# Patient Record
Sex: MALE | Race: WHITE | Employment: OTHER | URBAN - METROPOLITAN AREA
[De-identification: names, ages, dates, MRNs, and addresses within clinical notes are randomized per-mention and may not be internally consistent; named-entity substitution may affect disease eponyms.]

---

## 2018-03-12 ENCOUNTER — HOSPITAL ENCOUNTER (OUTPATIENT)
Dept: CT IMAGING | Age: 83
Discharge: HOME | End: 2018-03-12
Attending: ORTHOPAEDIC SURGERY
Payer: MEDICARE

## 2018-03-12 DIAGNOSIS — M16.11 PRIMARY OSTEOARTHRITIS OF RIGHT HIP: ICD-10-CM

## 2018-03-12 PROCEDURE — 73700 CT LOWER EXTREMITY W/O DYE: CPT

## 2018-04-10 RX ORDER — GABAPENTIN 800 MG/1
800 TABLET ORAL 3 TIMES DAILY
COMMUNITY

## 2018-04-10 RX ORDER — ZOLPIDEM TARTRATE 10 MG/1
10 TABLET ORAL
COMMUNITY

## 2018-04-10 RX ORDER — CHOLECALCIFEROL (VITAMIN D3) 125 MCG
2 CAPSULE ORAL DAILY
COMMUNITY

## 2018-04-10 RX ORDER — ASCORBIC ACID 500 MG
1000 TABLET ORAL DAILY
COMMUNITY

## 2018-04-10 RX ORDER — METFORMIN HYDROCHLORIDE 500 MG/1
500 TABLET ORAL
COMMUNITY

## 2018-04-10 RX ORDER — BACLOFEN 10 MG/1
10 TABLET ORAL 3 TIMES DAILY
COMMUNITY

## 2018-04-10 RX ORDER — HYDROCODONE BITARTRATE AND ACETAMINOPHEN 7.5; 325 MG/1; MG/1
1 TABLET ORAL 4 TIMES DAILY
COMMUNITY
End: 2018-04-26

## 2018-04-10 RX ORDER — ALBUTEROL SULFATE 90 UG/1
2 AEROSOL, METERED RESPIRATORY (INHALATION)
COMMUNITY

## 2018-04-11 ENCOUNTER — HOSPITAL ENCOUNTER (OUTPATIENT)
Dept: PREADMISSION TESTING | Age: 83
Discharge: HOME | End: 2018-04-11
Payer: MEDICARE

## 2018-04-11 VITALS
RESPIRATION RATE: 24 BRPM | HEIGHT: 72 IN | SYSTOLIC BLOOD PRESSURE: 146 MMHG | WEIGHT: 194.67 LBS | OXYGEN SATURATION: 97 % | DIASTOLIC BLOOD PRESSURE: 68 MMHG | TEMPERATURE: 98.5 F | BODY MASS INDEX: 26.37 KG/M2 | HEART RATE: 99 BPM

## 2018-04-11 LAB
ABO + RH BLD: NORMAL
ALBUMIN SERPL-MCNC: 3.7 G/DL (ref 3.5–5)
ALBUMIN/GLOB SERPL: 1.2 {RATIO} (ref 1.1–2.2)
ALP SERPL-CCNC: 48 U/L (ref 45–117)
ALT SERPL-CCNC: 18 U/L (ref 12–78)
ANION GAP SERPL CALC-SCNC: 5 MMOL/L (ref 5–15)
APPEARANCE UR: CLEAR
AST SERPL-CCNC: 14 U/L (ref 15–37)
ATRIAL RATE: 83 BPM
BACTERIA URNS QL MICRO: NEGATIVE /HPF
BASOPHILS # BLD: 0 K/UL (ref 0–0.1)
BASOPHILS NFR BLD: 0 % (ref 0–1)
BILIRUB SERPL-MCNC: 0.5 MG/DL (ref 0.2–1)
BILIRUB UR QL CFM: NEGATIVE
BLOOD GROUP ANTIBODIES SERPL: NORMAL
BUN SERPL-MCNC: 23 MG/DL (ref 6–20)
BUN/CREAT SERPL: 22 (ref 12–20)
CALCIUM SERPL-MCNC: 9.4 MG/DL (ref 8.5–10.1)
CALCULATED P AXIS, ECG09: 77 DEGREES
CALCULATED R AXIS, ECG10: 64 DEGREES
CALCULATED T AXIS, ECG11: 63 DEGREES
CHLORIDE SERPL-SCNC: 104 MMOL/L (ref 97–108)
CO2 SERPL-SCNC: 31 MMOL/L (ref 21–32)
COLOR UR: NORMAL
CREAT SERPL-MCNC: 1.06 MG/DL (ref 0.7–1.3)
CRP SERPL-MCNC: 2.29 MG/DL
DIAGNOSIS, 93000: NORMAL
DIFFERENTIAL METHOD BLD: ABNORMAL
EOSINOPHIL # BLD: 0.1 K/UL (ref 0–0.4)
EOSINOPHIL NFR BLD: 1 % (ref 0–7)
EPITH CASTS URNS QL MICRO: NORMAL /LPF
ERYTHROCYTE [DISTWIDTH] IN BLOOD BY AUTOMATED COUNT: 13.3 % (ref 11.5–14.5)
ERYTHROCYTE [SEDIMENTATION RATE] IN BLOOD: 22 MM/HR (ref 0–20)
EST. AVERAGE GLUCOSE BLD GHB EST-MCNC: 160 MG/DL
GLOBULIN SER CALC-MCNC: 3.1 G/DL (ref 2–4)
GLUCOSE SERPL-MCNC: 138 MG/DL (ref 65–100)
GLUCOSE UR STRIP.AUTO-MCNC: NEGATIVE MG/DL
HBA1C MFR BLD: 7.2 % (ref 4.2–6.3)
HCT VFR BLD AUTO: 42.9 % (ref 36.6–50.3)
HGB BLD-MCNC: 14.1 G/DL (ref 12.1–17)
HGB UR QL STRIP: NEGATIVE
IMM GRANULOCYTES # BLD: 0.1 K/UL (ref 0–0.04)
IMM GRANULOCYTES NFR BLD AUTO: 1 % (ref 0–0.5)
KETONES UR QL STRIP.AUTO: NEGATIVE MG/DL
LEUKOCYTE ESTERASE UR QL STRIP.AUTO: NEGATIVE
LYMPHOCYTES # BLD: 2.2 K/UL (ref 0.8–3.5)
LYMPHOCYTES NFR BLD: 23 % (ref 12–49)
MCH RBC QN AUTO: 30.6 PG (ref 26–34)
MCHC RBC AUTO-ENTMCNC: 32.9 G/DL (ref 30–36.5)
MCV RBC AUTO: 93.1 FL (ref 80–99)
MONOCYTES # BLD: 0.9 K/UL (ref 0–1)
MONOCYTES NFR BLD: 10 % (ref 5–13)
NEUTS SEG # BLD: 6.4 K/UL (ref 1.8–8)
NEUTS SEG NFR BLD: 66 % (ref 32–75)
NITRITE UR QL STRIP.AUTO: NEGATIVE
NRBC # BLD: 0 K/UL (ref 0–0.01)
NRBC BLD-RTO: 0 PER 100 WBC
P-R INTERVAL, ECG05: 128 MS
PH UR STRIP: 5.5 [PH] (ref 5–8)
PLATELET # BLD AUTO: 262 K/UL (ref 150–400)
PMV BLD AUTO: 10.2 FL (ref 8.9–12.9)
POTASSIUM SERPL-SCNC: 4.7 MMOL/L (ref 3.5–5.1)
PROT SERPL-MCNC: 6.8 G/DL (ref 6.4–8.2)
PROT UR STRIP-MCNC: NEGATIVE MG/DL
Q-T INTERVAL, ECG07: 358 MS
QRS DURATION, ECG06: 84 MS
QTC CALCULATION (BEZET), ECG08: 420 MS
RBC # BLD AUTO: 4.61 M/UL (ref 4.1–5.7)
RBC #/AREA URNS HPF: NORMAL /HPF (ref 0–5)
SODIUM SERPL-SCNC: 140 MMOL/L (ref 136–145)
SP GR UR REFRACTOMETRY: 1.03 (ref 1–1.03)
SPECIMEN EXP DATE BLD: NORMAL
UA: UC IF INDICATED,UAUC: NORMAL
UROBILINOGEN UR QL STRIP.AUTO: 0.2 EU/DL (ref 0.2–1)
VENTRICULAR RATE, ECG03: 83 BPM
WBC # BLD AUTO: 9.7 K/UL (ref 4.1–11.1)
WBC URNS QL MICRO: NORMAL /HPF (ref 0–4)

## 2018-04-11 PROCEDURE — 83036 HEMOGLOBIN GLYCOSYLATED A1C: CPT | Performed by: ORTHOPAEDIC SURGERY

## 2018-04-11 PROCEDURE — 81001 URINALYSIS AUTO W/SCOPE: CPT | Performed by: ORTHOPAEDIC SURGERY

## 2018-04-11 PROCEDURE — 85652 RBC SED RATE AUTOMATED: CPT | Performed by: ORTHOPAEDIC SURGERY

## 2018-04-11 PROCEDURE — 80053 COMPREHEN METABOLIC PANEL: CPT | Performed by: ORTHOPAEDIC SURGERY

## 2018-04-11 PROCEDURE — 84466 ASSAY OF TRANSFERRIN: CPT | Performed by: ORTHOPAEDIC SURGERY

## 2018-04-11 PROCEDURE — 93005 ELECTROCARDIOGRAM TRACING: CPT

## 2018-04-11 PROCEDURE — 86901 BLOOD TYPING SEROLOGIC RH(D): CPT | Performed by: ORTHOPAEDIC SURGERY

## 2018-04-11 PROCEDURE — 86140 C-REACTIVE PROTEIN: CPT | Performed by: ORTHOPAEDIC SURGERY

## 2018-04-11 PROCEDURE — 36415 COLL VENOUS BLD VENIPUNCTURE: CPT | Performed by: ORTHOPAEDIC SURGERY

## 2018-04-11 PROCEDURE — 85025 COMPLETE CBC W/AUTO DIFF WBC: CPT | Performed by: ORTHOPAEDIC SURGERY

## 2018-04-11 RX ORDER — LANOLIN ALCOHOL/MO/W.PET/CERES
500 CREAM (GRAM) TOPICAL DAILY
COMMUNITY

## 2018-04-11 NOTE — PERIOP NOTES
[facility] PREOPERATIVE INSTRUCTIONS    Surgery Date:  4/25/18    Surgery arrival time given by surgeon: NO  (If [facility] staff will call you between 3pm - 7pm the day before surgery with your arrival time. If your surgery is on a Monday, we will call you the preceding Friday. Please call [phone] after 7pm if you did not receive your arrival time.)  1. Report to the 2nd Floor Admitting Desk on the day of your surgery. Bring your insurance card, photo identification, and any copayment (if applicable). 2. You must have a responsible adult to drive you home and stay with you the first 24 hours after surgery if you are going home the same day of your surgery. 3. Nothing to eat or drink after midnight the night before surgery. This means NO water, gum, mints, coffee, juice, etc.    4. MEDICATIONS TO TAKE THE MORNING OF SURGERY WITH A SIP OF WATER:  Gabapentin, baclofen  5. No alcoholic beverages 24 hours before and after your surgery. 6. If you are being admitted to the hospital, please leave personal belongings/luggage in your car until you have an assigned hospital room number. (The hospital discharge time is NOON. Your adult  should be at the hospital prior to the noon discharge time unless otherwise instructed.)   7. STOP Aspirin and/or any non-steroidal anti-inflammatory drugs (i.e. Ibuprofen, Naproxen, Advil, Aleve) as directed by your surgeon. You may take Tylenol. Stop herbal supplements 1 week prior to surgery. 8. Wear comfortable clothes. Wear your glasses instead of contacts. Please leave all money, jewelry and valuables at home. No make-up, particularly mascara, the day of surgery. 9. REMOVE ALL body piercings, rings, and jewelry and leave at home. Wear your hair loose or down, no pony-tails, buns, or any metal hair clips. 10. If you shower the morning of surgery, please do not apply any lotions, powders, or deodorants afterwards.    Do not shave any body area within 24 hours of your surgery. 11. Please follow all instructions to avoid any potential surgical cancellation. 12. Should your physical condition change, (i.e. fever, cold, flu, etc.) please notify your surgeon as soon as possible. 13. If a situation occurs where you may be delayed, please call:  [phone]. 14. The Preadmission Testing staff can be reached at [phone]. 16.  Special Instructions:  · Use Chlorhexidine Care Fusion wash and sponges 3 days prior to surgery as instructed. · Incentive spirometer given with instructions to practice at home and bring back to the hospital on the day of surgery. · [facility] will contact you if your Hemoglobin A1C is greater than 7.5. · Ensure/Glucerna  sample, nutritional information, and Ensure/Glucerna coupon given. · Pain pamphlet and Call Don't Fall reminder reviewed with patient. ·  parking is complimentary Monday - Friday 7 am - 5 pm  · Bring PTA Medication list day of surgery with the last doses taken documented   · Do not bring medication bottles the day of surgery  The patient was contacted in person. He verbalizes understanding of all instructions does not need reinforcement. Patient knows to bring walker & albuterol.

## 2018-04-11 NOTE — H&P
PAT Pre-Op History & Physical    Patient: Tricia Billings                  MRN: [id number]          SSN: [id number]  YOB: 1935          Age: 80 y.o. Sex: male                Subjective:   Patient is a 80 y.o.  male who presents with history of chronic right hip pain that has been a problem for > 10 years per patient report. Rates his right hip pain 10/10 and describes it as a constant sharp pain. States that pain radiates from his right groin and outer hip into his right buttock. Walking exacerbates his hip pain as does getting in/out of car. States that his right hip pain makes it difficult to sleep at night. Has failed steroid joint injections, NSAIDs, narcotic pain medication, and PT. The patient was evaluated in the surgeon's office and it was determined that the most appropriate plan of care is to proceed with surgical intervention.   Patient's PCP Odilia Fountain MD            Past Medical History:   Diagnosis Date    Adverse effect of anesthesia     woke up during surgery     Arthritis     Cancer ([address] 2009    squamous cell lung- had RUL lobectomy    Chronic obstructive pulmonary disease ([address]     emphysema    Chronic pain     scar tissue in chest, neuropathy    Diabetes ([address]     Diverticulitis     Frequent falls     Gout     Insomnia     Lymphoma ([address] 2008    Had chemotherapy    Macular degeneration of right eye     Neuropathy     Paget's disease of bone     Skin cancer     Thromboembolus ([address] 1988    right arm/ subclavian after injury to right arm      Past Surgical History:   Procedure Laterality Date    CHEST SURGERY PROCEDURE UNLISTED  05/2009    Right upper lobe resection for cancer    HX CATARACT REMOVAL Bilateral     HX CHOLECYSTECTOMY      HX GI  1967    exploratory surgery, removed gall bladder    HX HERNIA REPAIR Left 2012    hernia repair x2    [facility]  9612,[id number], [id number]    lower back surgery x4    TOTAL HIP ARTHROPLASTY Left 2012      Prior to Admission medications    Medication Sig Start Date End Date Taking? Authorizing Provider   cyanocobalamin (VITAMIN B-12) 500 mcg tablet Take 500 mcg by mouth daily. Yes Historical Provider   HYDROcodone-acetaminophen (NORCO) 7.5-325 mg per tablet Take 1 Tab by mouth four (4) times daily. Yes Historical Provider   gabapentin (NEURONTIN) 800 mg tablet Take 800 mg by mouth three (3) times daily. Yes Historical Provider   zolpidem (AMBIEN) 10 mg tablet Take 10 mg by mouth nightly as needed. Yes Historical Provider   baclofen (LIORESAL) 10 mg tablet Take 10 mg by mouth three (3) times daily. Yes Historical Provider   DICLOFENAC SODIUM PO Take 75 mg by mouth two (2) times a day. Yes Historical Provider   metFORMIN (GLUCOPHAGE) 500 mg tablet Take 500 mg by mouth daily (with breakfast). Yes Historical Provider   albuterol (PROAIR HFA) 90 mcg/actuation inhaler Take 2 Puffs by inhalation every four (4) hours as needed for Wheezing. Yes Historical Provider   VIT A/VIT C/VIT E/ZINC/COPPER (PRESERVISION AREDS PO) Take 1 Cap by mouth daily. Yes Historical Provider   ascorbic acid, vitamin C, (VITAMIN C) 500 mg tablet Take 1,000 mg by mouth daily. Yes Historical Provider   cholecalciferol, vitamin D3, (VITAMIN D3) 2,000 unit tab Take 2 Tabs by mouth daily. Yes Historical Provider     Current Outpatient Prescriptions   Medication Sig    cyanocobalamin (VITAMIN B-12) 500 mcg tablet Take 500 mcg by mouth daily.  HYDROcodone-acetaminophen (NORCO) 7.5-325 mg per tablet Take 1 Tab by mouth four (4) times daily.  gabapentin (NEURONTIN) 800 mg tablet Take 800 mg by mouth three (3) times daily.  zolpidem (AMBIEN) 10 mg tablet Take 10 mg by mouth nightly as needed.  baclofen (LIORESAL) 10 mg tablet Take 10 mg by mouth three (3) times daily.  DICLOFENAC SODIUM PO Take 75 mg by mouth two (2) times a day.     metFORMIN (GLUCOPHAGE) 500 mg tablet Take 500 mg by mouth daily (with breakfast).  albuterol (PROAIR HFA) 90 mcg/actuation inhaler Take 2 Puffs by inhalation every four (4) hours as needed for Wheezing.  VIT A/VIT C/VIT E/ZINC/COPPER (PRESERVISION AREDS PO) Take 1 Cap by mouth daily.  ascorbic acid, vitamin C, (VITAMIN C) 500 mg tablet Take 1,000 mg by mouth daily.  cholecalciferol, vitamin D3, (VITAMIN D3) 2,000 unit tab Take 2 Tabs by mouth daily. No current facility-administered medications for this encounter. Allergies   Allergen Reactions    Demerol [Meperidine] Other (comments)     Agitation and confusion    Pcn [Penicillins] Swelling      Social History   Substance Use Topics    Smoking status: Former Smoker     Packs/day: 0.75     Years: 50.00     Quit date: 4/10/1993    Smokeless tobacco: Never Used    Alcohol use No      History   Drug Use No     Family History   Problem Relation Age of Onset    Heart Disease Mother     Cancer Father      Melanoma    Parkinson's Disease Sister     Cancer Sister      skin    Other Brother      Agent Orange Exposure    Parkinson's Disease Sister     Dementia Sister          Review of Systems    Patient denies difficulty swallowing, mouth sores, or loose teeth. Patient denies any recent dental procedures or any planned prior to surgery. Patient denies chest pain, tightness, pain radiating down left arm, palpitations. Denies dizziness, visual disturbances, or lightheadedness. Patient denies shortness of breath at rest, wheezing, cough, fever, or chills. Patient denies diarrhea, constipation, or abdominal pain. Patient denies urinary problems including dysuria, hesitancy, urgency, or incontinence. Does c/o nocturia. Denies skin breakdown, rashes, insect bites or open area. C/o right hip and right knee pain.         Objective:   Patient Vitals for the past 24 hrs:   Temp Pulse Resp BP SpO2   18 1316 98.5 °F (36.9 °C) 99 24 146/68 97 %     Temp (24hrs), Av.5 °F (36.9 °C), Min:98.5 °F (36.9 °C), Max:98.5 °F (36.9 °C)    Body mass index is 26.4 kg/(m^2). Wt Readings from Last 1 Encounters:   04/11/18 88.3 kg (194 lb 10.7 oz)        Physical Exam:     General: Pleasant,  cooperative, no apparent distress, appears stated age. Uses cane to ambulate. Eyes: Conjunctivae/corneas clear. EOMs intact. Ears: Bilateral hearing aids in place. Nose: Nares normal.   Mouth/Throat: Lips, mucosa, and tongue normal. Edentulous. Neck: Supple, symmetrical, trachea midline. Back: Symmetric   Lungs: Diminished breath sounds but clear to auscultation bilaterally. Heart: Regular rate and rhythm, S1, S2 normal. No murmur, click, rub or gallop. Abdomen: Soft, non-tender. Bowel sounds normal. No distention. Musculoskeletal:  Gait is antalgic. Extremities:  Extremities normal, atraumatic, no cyanosis or edema. Calves                                 supple, non tender to palpation. Pulses: 2+ and symmetric bilateral upper extremities. Cap. refill <2 seconds   Skin: Skin color, texture, turgor normal for age. No rashes or lesions. Neurologic: CN II-XII grossly intact. Alert and oriented x3.     Labs:   Recent Results (from the past 72 hour(s))   CULTURE, MRSA    Collection Time: 04/11/18  1:13 PM   Result Value Ref Range    Special Requests: NO SPECIAL REQUESTS      Culture result: MRSA NOT PRESENT  AT 15 HOURS     TYPE & SCREEN    Collection Time: 04/11/18  2:20 PM   Result Value Ref Range    Crossmatch Expiration 04/25/2018     ABO/Rh(D) A POSITIVE     Antibody screen NEG    C REACTIVE PROTEIN, QT    Collection Time: 04/11/18  2:20 PM   Result Value Ref Range    C-Reactive protein 2.29 (H) <0.60 mg/dL   CBC WITH AUTOMATED DIFF    Collection Time: 04/11/18  2:20 PM   Result Value Ref Range    WBC 9.7 4.1 - 11.1 K/uL    RBC 4.61 4.10 - 5.70 M/uL    HGB 14.1 12.1 - 17.0 g/dL    HCT 42.9 36.6 - 50.3 %    MCV 93.1 80.0 - 99.0 FL    MCH 30.6 26.0 - 34.0 PG    MCHC 32.9 30.0 - 36.5 g/dL    RDW 13.3 11.5 - 14.5 %    PLATELET 888 435 - 658 K/uL    MPV 10.2 8.9 - 12.9 FL    NRBC 0.0 0  WBC    ABSOLUTE NRBC 0.00 0.00 - 0.01 K/uL    NEUTROPHILS 66 32 - 75 %    LYMPHOCYTES 23 12 - 49 %    MONOCYTES 10 5 - 13 %    EOSINOPHILS 1 0 - 7 %    BASOPHILS 0 0 - 1 %    IMMATURE GRANULOCYTES 1 (H) 0.0 - 0.5 %    ABS. NEUTROPHILS 6.4 1.8 - 8.0 K/UL    ABS. LYMPHOCYTES 2.2 0.8 - 3.5 K/UL    ABS. MONOCYTES 0.9 0.0 - 1.0 K/UL    ABS. EOSINOPHILS 0.1 0.0 - 0.4 K/UL    ABS. BASOPHILS 0.0 0.0 - 0.1 K/UL    ABS. IMM. GRANS. 0.1 (H) 0.00 - 0.04 K/UL    DF AUTOMATED     METABOLIC PANEL, COMPREHENSIVE    Collection Time: 04/11/18  2:20 PM   Result Value Ref Range    Sodium 140 136 - 145 mmol/L    Potassium 4.7 3.5 - 5.1 mmol/L    Chloride 104 97 - 108 mmol/L    CO2 31 21 - 32 mmol/L    Anion gap 5 5 - 15 mmol/L    Glucose 138 (H) 65 - 100 mg/dL    BUN 23 (H) 6 - 20 MG/DL    Creatinine 1.06 0.70 - 1.30 MG/DL    BUN/Creatinine ratio 22 (H) 12 - 20      GFR est AA >60 >60 ml/min/1.73m2    GFR est non-AA >60 >60 ml/min/1.73m2    Calcium 9.4 8.5 - 10.1 MG/DL    Bilirubin, total 0.5 0.2 - 1.0 MG/DL    ALT (SGPT) 18 12 - 78 U/L    AST (SGOT) 14 (L) 15 - 37 U/L    Alk.  phosphatase 48 45 - 117 U/L    Protein, total 6.8 6.4 - 8.2 g/dL    Albumin 3.7 3.5 - 5.0 g/dL    Globulin 3.1 2.0 - 4.0 g/dL    A-G Ratio 1.2 1.1 - 2.2     HEMOGLOBIN A1C WITH EAG    Collection Time: 04/11/18  2:20 PM   Result Value Ref Range    Hemoglobin A1c 7.2 (H) 4.2 - 6.3 %    Est. average glucose 160 mg/dL   SED RATE (ESR)    Collection Time: 04/11/18  2:20 PM   Result Value Ref Range    Sed rate, automated 22 (H) 0 - 20 mm/hr   URINALYSIS W/ REFLEX CULTURE    Collection Time: 04/11/18  2:20 PM   Result Value Ref Range    Color DARK YELLOW      Appearance CLEAR CLEAR      Specific gravity 1.026 1.003 - 1.030      pH (UA) 5.5 5.0 - 8.0      Protein NEGATIVE  NEG mg/dL    Glucose NEGATIVE  NEG mg/dL    Ketone NEGATIVE  NEG mg/dL    Blood NEGATIVE  NEG Urobilinogen 0.2 0.2 - 1.0 EU/dL    Nitrites NEGATIVE  NEG      Leukocyte Esterase NEGATIVE  NEG      WBC 0-4 0 - 4 /hpf    RBC 0-5 0 - 5 /hpf    Epithelial cells FEW FEW /lpf    Bacteria NEGATIVE  NEG /hpf    UA:UC IF INDICATED CULTURE NOT INDICATED BY UA RESULT CNI     BILIRUBIN, CONFIRM    Collection Time: 04/11/18  2:20 PM   Result Value Ref Range    Bilirubin UA, confirm NEGATIVE  NEG     EKG, 12 LEAD, INITIAL    Collection Time: 04/11/18  2:43 PM   Result Value Ref Range    Ventricular Rate 83 BPM    Atrial Rate 83 BPM    P-R Interval 128 ms    QRS Duration 84 ms    Q-T Interval 358 ms    QTC Calculation (Bezet) 420 ms    Calculated P Axis 77 degrees    Calculated R Axis 64 degrees    Calculated T Axis 63 degrees    Diagnosis       Normal sinus rhythm  Normal ECG  No previous ECGs available  Confirmed by Duane Latham MD., Gray Lugo ([id number]) on 4/11/2018 10:19:43 PM         Assessment:     Degenerative joint disease right hip. Chronic pain- sees pain management 1x/month- takes Hydrocodone 7.5/325 four times a day. Plan:     Scheduled for right hip arthroplasty direct anterior. Labs and EKG done per surgeon's order. Lab results and EKG reviewed- unremarkable except CRP elevated (2.29) and ESR slightly elevated 22. MRSA and transferrin pending. Attended joint class 04/11/2018. Last PCP note dated 03/06/2018 from Dr. Serene Hurtado reviewed and placed on chart. Pain management after surgery could be problematic.       Quynh Lema NP

## 2018-04-12 LAB
BACTERIA SPEC CULT: NORMAL
BACTERIA SPEC CULT: NORMAL
SERVICE CMNT-IMP: NORMAL

## 2018-04-12 NOTE — PROGRESS NOTES
Problem: Patient Education: Go to Patient Education Activity  Goal: Patient/Family Education  Outcome: Resolved/Met Date Met: 04/12/18  The patient attended the pre-operative joint replacement class. The content of the class, using a power-point presentation as well as visual demonstration was specific for patients undergoing total knee and total hip replacements. A pre-operative education booklet was provided to all patients. At the conclusion of class an opportunity was offered for any question or concerns the patient or family may have regarding their upcoming scheduled procedure. Patient and  attended class on 4/11/18.

## 2018-04-13 LAB — TRANSFERRIN SERPL-MCNC: 229 MG/DL (ref 200–370)

## 2018-04-24 ENCOUNTER — ANESTHESIA EVENT (OUTPATIENT)
Dept: SURGERY | Age: 83
DRG: 470 | End: 2018-04-24
Payer: MEDICARE

## 2018-04-25 ENCOUNTER — ANESTHESIA (OUTPATIENT)
Dept: SURGERY | Age: 83
DRG: 470 | End: 2018-04-25
Payer: MEDICARE

## 2018-04-25 ENCOUNTER — HOSPITAL ENCOUNTER (INPATIENT)
Age: 83
LOS: 1 days | Discharge: HOME | DRG: 470 | End: 2018-04-26
Attending: ORTHOPAEDIC SURGERY | Admitting: ORTHOPAEDIC SURGERY
Payer: MEDICARE

## 2018-04-25 ENCOUNTER — APPOINTMENT (OUTPATIENT)
Dept: GENERAL RADIOLOGY | Age: 83
DRG: 470 | End: 2018-04-25
Attending: PHYSICIAN ASSISTANT
Payer: MEDICARE

## 2018-04-25 DIAGNOSIS — M16.11 PRIMARY OSTEOARTHRITIS OF RIGHT HIP: Primary | ICD-10-CM

## 2018-04-25 LAB
GLUCOSE BLD STRIP.AUTO-MCNC: 104 MG/DL (ref 65–100)
GLUCOSE BLD STRIP.AUTO-MCNC: 158 MG/DL (ref 65–100)
GLUCOSE BLD STRIP.AUTO-MCNC: 168 MG/DL (ref 65–100)
SERVICE CMNT-IMP: ABNORMAL

## 2018-04-25 PROCEDURE — 77030039266 HC ADH SKN EXOFIN S2SG -A: Performed by: ORTHOPAEDIC SURGERY

## 2018-04-25 PROCEDURE — 77030020263 HC SOL INJ SOD CL0.9% LFCR 1000ML: Performed by: ORTHOPAEDIC SURGERY

## 2018-04-25 PROCEDURE — 74011250636 HC RX REV CODE- 250/636: Performed by: ANESTHESIOLOGY

## 2018-04-25 PROCEDURE — 82962 GLUCOSE BLOOD TEST: CPT

## 2018-04-25 PROCEDURE — 77030029821: Performed by: ORTHOPAEDIC SURGERY

## 2018-04-25 PROCEDURE — 0SR902Z REPLACEMENT OF RIGHT HIP JOINT WITH METAL ON POLYETHYLENE SYNTHETIC SUBSTITUTE, OPEN APPROACH: ICD-10-PCS | Performed by: ORTHOPAEDIC SURGERY

## 2018-04-25 PROCEDURE — 74011250636 HC RX REV CODE- 250/636: Performed by: PHYSICIAN ASSISTANT

## 2018-04-25 PROCEDURE — 94762 N-INVAS EAR/PLS OXIMTRY CONT: CPT

## 2018-04-25 PROCEDURE — 77030018883 HC BLD SAW SAG4 STRY -B: Performed by: ORTHOPAEDIC SURGERY

## 2018-04-25 PROCEDURE — C1713 ANCHOR/SCREW BN/BN,TIS/BN: HCPCS | Performed by: ORTHOPAEDIC SURGERY

## 2018-04-25 PROCEDURE — 77030031139 HC SUT VCRL2 J&J -A: Performed by: ORTHOPAEDIC SURGERY

## 2018-04-25 PROCEDURE — 74011000250 HC RX REV CODE- 250: Performed by: ORTHOPAEDIC SURGERY

## 2018-04-25 PROCEDURE — 77030020782 HC GWN BAIR PAWS FLX 3M -B

## 2018-04-25 PROCEDURE — 74011250636 HC RX REV CODE- 250/636

## 2018-04-25 PROCEDURE — 77030002933 HC SUT MCRYL J&J -A: Performed by: ORTHOPAEDIC SURGERY

## 2018-04-25 PROCEDURE — 74011250637 HC RX REV CODE- 250/637: Performed by: PHYSICIAN ASSISTANT

## 2018-04-25 PROCEDURE — 74011250637 HC RX REV CODE- 250/637: Performed by: ANESTHESIOLOGY

## 2018-04-25 PROCEDURE — 77030020788: Performed by: ORTHOPAEDIC SURGERY

## 2018-04-25 PROCEDURE — 77030035236 HC SUT PDS STRATFX BARB J&J -B: Performed by: ORTHOPAEDIC SURGERY

## 2018-04-25 PROCEDURE — 76030000020 HC AMB SURG 1.5 TO 2 HR INTENSV-TIER 1: Performed by: ORTHOPAEDIC SURGERY

## 2018-04-25 PROCEDURE — 74011000272 HC RX REV CODE- 272: Performed by: ORTHOPAEDIC SURGERY

## 2018-04-25 PROCEDURE — 76210000035 HC AMBSU PH I REC 1 TO 1.5 HR: Performed by: ORTHOPAEDIC SURGERY

## 2018-04-25 PROCEDURE — 77030029829 HC TIB INST CKPNT DISP STRY -B: Performed by: ORTHOPAEDIC SURGERY

## 2018-04-25 PROCEDURE — 65270000029 HC RM PRIVATE

## 2018-04-25 PROCEDURE — 74011000250 HC RX REV CODE- 250

## 2018-04-25 PROCEDURE — 72170 X-RAY EXAM OF PELVIS: CPT

## 2018-04-25 PROCEDURE — C1776 JOINT DEVICE (IMPLANTABLE): HCPCS | Performed by: ORTHOPAEDIC SURGERY

## 2018-04-25 PROCEDURE — 76060000063 HC AMB SURG ANES 1.5 TO 2 HR: Performed by: ORTHOPAEDIC SURGERY

## 2018-04-25 PROCEDURE — 77030011640 HC PAD GRND REM COVD -A: Performed by: ORTHOPAEDIC SURGERY

## 2018-04-25 PROCEDURE — 77030012935 HC DRSG AQUACEL BMS -B: Performed by: ORTHOPAEDIC SURGERY

## 2018-04-25 PROCEDURE — 77030003666 HC NDL SPINAL BD -A

## 2018-04-25 PROCEDURE — 77030013708 HC HNDPC SUC IRR PULS STRY –B: Performed by: ORTHOPAEDIC SURGERY

## 2018-04-25 PROCEDURE — 77030018673: Performed by: ORTHOPAEDIC SURGERY

## 2018-04-25 PROCEDURE — 77030018836 HC SOL IRR NACL ICUM -A: Performed by: ORTHOPAEDIC SURGERY

## 2018-04-25 PROCEDURE — 77030020262 HC SOL INJ SOD CL 0.9% 100ML: Performed by: ORTHOPAEDIC SURGERY

## 2018-04-25 PROCEDURE — 77030007866 HC KT SPN ANES BBMI -B

## 2018-04-25 DEVICE — CERAMIC V40 FEMORAL HEAD
Type: IMPLANTABLE DEVICE | Site: HIP | Status: FUNCTIONAL
Brand: BIOLOX

## 2018-04-25 DEVICE — HEMISPHERICAL CLUSTER HOLE SHELL
Type: IMPLANTABLE DEVICE | Site: HIP | Status: FUNCTIONAL
Brand: TRITANIUM

## 2018-04-25 DEVICE — 127 DEGREE NECK ANGLE HIP STEM
Type: IMPLANTABLE DEVICE | Site: HIP | Status: FUNCTIONAL
Brand: ACCOLADE

## 2018-04-25 DEVICE — 0 DEGREE POLYETHYLENE INSERT
Type: IMPLANTABLE DEVICE | Site: HIP | Status: FUNCTIONAL
Brand: TRIDENT

## 2018-04-25 DEVICE — COMPONENT HIP PRSS FT MTL ON CERM POLYETH X3: Type: IMPLANTABLE DEVICE | Status: FUNCTIONAL

## 2018-04-25 RX ORDER — SODIUM CHLORIDE, SODIUM LACTATE, POTASSIUM CHLORIDE, CALCIUM CHLORIDE 600; 310; 30; 20 MG/100ML; MG/100ML; MG/100ML; MG/100ML
125 INJECTION, SOLUTION INTRAVENOUS CONTINUOUS
Status: DISCONTINUED | OUTPATIENT
Start: 2018-04-25 | End: 2018-04-25 | Stop reason: HOSPADM

## 2018-04-25 RX ORDER — DIPHENHYDRAMINE HYDROCHLORIDE 50 MG/ML
12.5 INJECTION, SOLUTION INTRAMUSCULAR; INTRAVENOUS
Status: DISCONTINUED | OUTPATIENT
Start: 2018-04-25 | End: 2018-04-26 | Stop reason: HOSPADM

## 2018-04-25 RX ORDER — HYDROMORPHONE HYDROCHLORIDE 2 MG/ML
.25-1 INJECTION, SOLUTION INTRAMUSCULAR; INTRAVENOUS; SUBCUTANEOUS
Status: DISCONTINUED | OUTPATIENT
Start: 2018-04-25 | End: 2018-04-25 | Stop reason: HOSPADM

## 2018-04-25 RX ORDER — FENTANYL CITRATE 50 UG/ML
25 INJECTION, SOLUTION INTRAMUSCULAR; INTRAVENOUS
Status: DISCONTINUED | OUTPATIENT
Start: 2018-04-25 | End: 2018-04-25 | Stop reason: HOSPADM

## 2018-04-25 RX ORDER — ACETAMINOPHEN 325 MG/1
650 TABLET ORAL EVERY 6 HOURS
Status: DISCONTINUED | OUTPATIENT
Start: 2018-04-25 | End: 2018-04-26 | Stop reason: HOSPADM

## 2018-04-25 RX ORDER — DEXAMETHASONE SODIUM PHOSPHATE 4 MG/ML
INJECTION, SOLUTION INTRA-ARTICULAR; INTRALESIONAL; INTRAMUSCULAR; INTRAVENOUS; SOFT TISSUE AS NEEDED
Status: DISCONTINUED | OUTPATIENT
Start: 2018-04-25 | End: 2018-04-25 | Stop reason: HOSPADM

## 2018-04-25 RX ORDER — MAGNESIUM SULFATE 100 %
4 CRYSTALS MISCELLANEOUS AS NEEDED
Status: DISCONTINUED | OUTPATIENT
Start: 2018-04-25 | End: 2018-04-26 | Stop reason: HOSPADM

## 2018-04-25 RX ORDER — EPHEDRINE SULFATE 50 MG/ML
INJECTION, SOLUTION INTRAVENOUS AS NEEDED
Status: DISCONTINUED | OUTPATIENT
Start: 2018-04-25 | End: 2018-04-25 | Stop reason: HOSPADM

## 2018-04-25 RX ORDER — LIDOCAINE HYDROCHLORIDE 10 MG/ML
0.1 INJECTION, SOLUTION EPIDURAL; INFILTRATION; INTRACAUDAL; PERINEURAL AS NEEDED
Status: DISCONTINUED | OUTPATIENT
Start: 2018-04-25 | End: 2018-04-25 | Stop reason: HOSPADM

## 2018-04-25 RX ORDER — CELECOXIB 100 MG/1
200 CAPSULE ORAL 2 TIMES DAILY
Status: DISCONTINUED | OUTPATIENT
Start: 2018-04-25 | End: 2018-04-26 | Stop reason: HOSPADM

## 2018-04-25 RX ORDER — PROPOFOL 10 MG/ML
INJECTION, EMULSION INTRAVENOUS
Status: DISCONTINUED | OUTPATIENT
Start: 2018-04-25 | End: 2018-04-25 | Stop reason: HOSPADM

## 2018-04-25 RX ORDER — SODIUM CHLORIDE 0.9 % (FLUSH) 0.9 %
5-10 SYRINGE (ML) INJECTION EVERY 8 HOURS
Status: DISCONTINUED | OUTPATIENT
Start: 2018-04-26 | End: 2018-04-26 | Stop reason: HOSPADM

## 2018-04-25 RX ORDER — METFORMIN HYDROCHLORIDE 500 MG/1
500 TABLET ORAL
Status: DISCONTINUED | OUTPATIENT
Start: 2018-04-26 | End: 2018-04-26 | Stop reason: HOSPADM

## 2018-04-25 RX ORDER — POLYETHYLENE GLYCOL 3350 17 G/17G
17 POWDER, FOR SOLUTION ORAL DAILY
Status: DISCONTINUED | OUTPATIENT
Start: 2018-04-26 | End: 2018-04-26 | Stop reason: HOSPADM

## 2018-04-25 RX ORDER — ONDANSETRON 8 MG/1
4 TABLET, ORALLY DISINTEGRATING ORAL
Qty: 30 TAB | Refills: 0 | Status: SHIPPED | OUTPATIENT
Start: 2018-04-25

## 2018-04-25 RX ORDER — MIDAZOLAM HYDROCHLORIDE 1 MG/ML
2 INJECTION, SOLUTION INTRAMUSCULAR; INTRAVENOUS
Status: DISCONTINUED | OUTPATIENT
Start: 2018-04-25 | End: 2018-04-25

## 2018-04-25 RX ORDER — FAMOTIDINE 20 MG/1
20 TABLET, FILM COATED ORAL 2 TIMES DAILY
Status: DISCONTINUED | OUTPATIENT
Start: 2018-04-25 | End: 2018-04-26 | Stop reason: HOSPADM

## 2018-04-25 RX ORDER — SODIUM CHLORIDE 9 MG/ML
125 INJECTION, SOLUTION INTRAVENOUS CONTINUOUS
Status: DISPENSED | OUTPATIENT
Start: 2018-04-25 | End: 2018-04-26

## 2018-04-25 RX ORDER — ASPIRIN 325 MG
325 TABLET, DELAYED RELEASE (ENTERIC COATED) ORAL 2 TIMES DAILY
Status: DISCONTINUED | OUTPATIENT
Start: 2018-04-25 | End: 2018-04-26 | Stop reason: HOSPADM

## 2018-04-25 RX ORDER — ZOLPIDEM TARTRATE 5 MG/1
10 TABLET ORAL
Status: DISCONTINUED | OUTPATIENT
Start: 2018-04-25 | End: 2018-04-25

## 2018-04-25 RX ORDER — OXYCODONE HYDROCHLORIDE 5 MG/1
5 TABLET ORAL
Status: DISCONTINUED | OUTPATIENT
Start: 2018-04-25 | End: 2018-04-26 | Stop reason: HOSPADM

## 2018-04-25 RX ORDER — BACLOFEN 10 MG/1
10 TABLET ORAL 3 TIMES DAILY
Status: DISCONTINUED | OUTPATIENT
Start: 2018-04-25 | End: 2018-04-26 | Stop reason: HOSPADM

## 2018-04-25 RX ORDER — SODIUM CHLORIDE 0.9 % (FLUSH) 0.9 %
5-10 SYRINGE (ML) INJECTION AS NEEDED
Status: DISCONTINUED | OUTPATIENT
Start: 2018-04-25 | End: 2018-04-26 | Stop reason: HOSPADM

## 2018-04-25 RX ORDER — OXYCODONE HYDROCHLORIDE 5 MG/1
10 TABLET ORAL
Status: DISCONTINUED | OUTPATIENT
Start: 2018-04-25 | End: 2018-04-26 | Stop reason: HOSPADM

## 2018-04-25 RX ORDER — ONDANSETRON 2 MG/ML
4 INJECTION INTRAMUSCULAR; INTRAVENOUS
Status: DISCONTINUED | OUTPATIENT
Start: 2018-04-25 | End: 2018-04-26 | Stop reason: HOSPADM

## 2018-04-25 RX ORDER — NALOXONE HYDROCHLORIDE 0.4 MG/ML
0.2 INJECTION, SOLUTION INTRAMUSCULAR; INTRAVENOUS; SUBCUTANEOUS
Status: DISCONTINUED | OUTPATIENT
Start: 2018-04-25 | End: 2018-04-25 | Stop reason: HOSPADM

## 2018-04-25 RX ORDER — TRAMADOL HYDROCHLORIDE 50 MG/1
50 TABLET ORAL
Qty: 60 TAB | Refills: 0 | Status: SHIPPED | OUTPATIENT
Start: 2018-04-25

## 2018-04-25 RX ORDER — OXYCODONE HYDROCHLORIDE 5 MG/1
5-10 TABLET ORAL
Qty: 70 TAB | Refills: 0 | Status: SHIPPED | OUTPATIENT
Start: 2018-04-25

## 2018-04-25 RX ORDER — ACETAMINOPHEN 500 MG
500-1000 TABLET ORAL
Qty: 60 TAB | Refills: 0 | Status: SHIPPED | OUTPATIENT
Start: 2018-04-25

## 2018-04-25 RX ORDER — CEFAZOLIN SODIUM/WATER 2 G/20 ML
2 SYRINGE (ML) INTRAVENOUS ONCE
Status: COMPLETED | OUTPATIENT
Start: 2018-04-25 | End: 2018-04-25

## 2018-04-25 RX ORDER — DIPHENHYDRAMINE HYDROCHLORIDE 50 MG/ML
12.5 INJECTION, SOLUTION INTRAMUSCULAR; INTRAVENOUS AS NEEDED
Status: DISCONTINUED | OUTPATIENT
Start: 2018-04-25 | End: 2018-04-25

## 2018-04-25 RX ORDER — FLUMAZENIL 0.1 MG/ML
0.2 INJECTION INTRAVENOUS
Status: DISCONTINUED | OUTPATIENT
Start: 2018-04-25 | End: 2018-04-25 | Stop reason: HOSPADM

## 2018-04-25 RX ORDER — INSULIN LISPRO 100 [IU]/ML
INJECTION, SOLUTION INTRAVENOUS; SUBCUTANEOUS
Status: DISCONTINUED | OUTPATIENT
Start: 2018-04-25 | End: 2018-04-26 | Stop reason: HOSPADM

## 2018-04-25 RX ORDER — ASPIRIN 325 MG
325 TABLET, DELAYED RELEASE (ENTERIC COATED) ORAL 2 TIMES DAILY
Qty: 60 TAB | Refills: 0 | Status: SHIPPED | OUTPATIENT
Start: 2018-04-25

## 2018-04-25 RX ORDER — NALOXONE HYDROCHLORIDE 0.4 MG/ML
0.4 INJECTION, SOLUTION INTRAMUSCULAR; INTRAVENOUS; SUBCUTANEOUS AS NEEDED
Status: DISCONTINUED | OUTPATIENT
Start: 2018-04-25 | End: 2018-04-26 | Stop reason: HOSPADM

## 2018-04-25 RX ORDER — ZOLPIDEM TARTRATE 5 MG/1
5 TABLET ORAL
Status: DISCONTINUED | OUTPATIENT
Start: 2018-04-25 | End: 2018-04-26 | Stop reason: HOSPADM

## 2018-04-25 RX ORDER — HYDROMORPHONE HYDROCHLORIDE 2 MG/ML
1 INJECTION, SOLUTION INTRAMUSCULAR; INTRAVENOUS; SUBCUTANEOUS ONCE
Status: COMPLETED | OUTPATIENT
Start: 2018-04-25 | End: 2018-04-25

## 2018-04-25 RX ORDER — ZOLPIDEM TARTRATE 5 MG/1
10 TABLET ORAL
Status: DISCONTINUED | OUTPATIENT
Start: 2018-04-25 | End: 2018-04-25 | Stop reason: DRUGHIGH

## 2018-04-25 RX ORDER — FENTANYL CITRATE 50 UG/ML
INJECTION, SOLUTION INTRAMUSCULAR; INTRAVENOUS AS NEEDED
Status: DISCONTINUED | OUTPATIENT
Start: 2018-04-25 | End: 2018-04-25 | Stop reason: HOSPADM

## 2018-04-25 RX ORDER — ALBUTEROL SULFATE 0.83 MG/ML
2.5 SOLUTION RESPIRATORY (INHALATION)
Status: DISCONTINUED | OUTPATIENT
Start: 2018-04-25 | End: 2018-04-26 | Stop reason: HOSPADM

## 2018-04-25 RX ORDER — MIDAZOLAM HYDROCHLORIDE 1 MG/ML
INJECTION, SOLUTION INTRAMUSCULAR; INTRAVENOUS AS NEEDED
Status: DISCONTINUED | OUTPATIENT
Start: 2018-04-25 | End: 2018-04-25 | Stop reason: HOSPADM

## 2018-04-25 RX ORDER — DEXTROSE 50 % IN WATER (D50W) INTRAVENOUS SYRINGE
12.5-25 AS NEEDED
Status: DISCONTINUED | OUTPATIENT
Start: 2018-04-25 | End: 2018-04-26 | Stop reason: HOSPADM

## 2018-04-25 RX ORDER — ACETAMINOPHEN 325 MG/1
975 TABLET ORAL ONCE
Status: COMPLETED | OUTPATIENT
Start: 2018-04-25 | End: 2018-04-25

## 2018-04-25 RX ORDER — SODIUM CHLORIDE, SODIUM LACTATE, POTASSIUM CHLORIDE, CALCIUM CHLORIDE 600; 310; 30; 20 MG/100ML; MG/100ML; MG/100ML; MG/100ML
125 INJECTION, SOLUTION INTRAVENOUS CONTINUOUS
Status: DISCONTINUED | OUTPATIENT
Start: 2018-04-25 | End: 2018-04-25

## 2018-04-25 RX ORDER — ZOLPIDEM TARTRATE 5 MG/1
5 TABLET ORAL
Status: DISCONTINUED | OUTPATIENT
Start: 2018-04-25 | End: 2018-04-25

## 2018-04-25 RX ORDER — IBUPROFEN 800 MG/1
800 TABLET ORAL
Qty: 50 TAB | Refills: 2 | Status: SHIPPED | OUTPATIENT
Start: 2018-04-25

## 2018-04-25 RX ORDER — FACIAL-BODY WIPES
10 EACH TOPICAL DAILY PRN
Status: DISCONTINUED | OUTPATIENT
Start: 2018-04-27 | End: 2018-04-26 | Stop reason: HOSPADM

## 2018-04-25 RX ORDER — CEFAZOLIN SODIUM/WATER 2 G/20 ML
2 SYRINGE (ML) INTRAVENOUS EVERY 8 HOURS
Status: COMPLETED | OUTPATIENT
Start: 2018-04-25 | End: 2018-04-26

## 2018-04-25 RX ORDER — PREGABALIN 75 MG/1
75 CAPSULE ORAL ONCE
Status: COMPLETED | OUTPATIENT
Start: 2018-04-25 | End: 2018-04-25

## 2018-04-25 RX ORDER — POVIDONE-IODINE 10 %
SOLUTION, NON-ORAL TOPICAL AS NEEDED
Status: DISCONTINUED | OUTPATIENT
Start: 2018-04-25 | End: 2018-04-25 | Stop reason: HOSPADM

## 2018-04-25 RX ORDER — HYDROMORPHONE HYDROCHLORIDE 2 MG/ML
0.5 INJECTION, SOLUTION INTRAMUSCULAR; INTRAVENOUS; SUBCUTANEOUS
Status: DISCONTINUED | OUTPATIENT
Start: 2018-04-25 | End: 2018-04-26 | Stop reason: HOSPADM

## 2018-04-25 RX ORDER — BUPIVACAINE HYDROCHLORIDE 5 MG/ML
INJECTION, SOLUTION EPIDURAL; INTRACAUDAL AS NEEDED
Status: DISCONTINUED | OUTPATIENT
Start: 2018-04-25 | End: 2018-04-25 | Stop reason: HOSPADM

## 2018-04-25 RX ORDER — OXYCODONE HYDROCHLORIDE 5 MG/1
5 TABLET ORAL
Status: DISCONTINUED | OUTPATIENT
Start: 2018-04-25 | End: 2018-04-25 | Stop reason: HOSPADM

## 2018-04-25 RX ORDER — AMOXICILLIN 250 MG
1 CAPSULE ORAL 2 TIMES DAILY
Status: DISCONTINUED | OUTPATIENT
Start: 2018-04-25 | End: 2018-04-26 | Stop reason: HOSPADM

## 2018-04-25 RX ADMIN — ACETAMINOPHEN 650 MG: 325 TABLET ORAL at 23:55

## 2018-04-25 RX ADMIN — ZOLPIDEM TARTRATE 5 MG: 5 TABLET ORAL at 22:21

## 2018-04-25 RX ADMIN — FENTANYL CITRATE 50 MCG: 50 INJECTION, SOLUTION INTRAMUSCULAR; INTRAVENOUS at 12:43

## 2018-04-25 RX ADMIN — BACLOFEN 10 MG: 10 TABLET ORAL at 18:38

## 2018-04-25 RX ADMIN — HYDROMORPHONE HYDROCHLORIDE 0.5 MG: 2 INJECTION INTRAMUSCULAR; INTRAVENOUS; SUBCUTANEOUS at 11:16

## 2018-04-25 RX ADMIN — MIDAZOLAM HYDROCHLORIDE 1 MG: 1 INJECTION, SOLUTION INTRAMUSCULAR; INTRAVENOUS at 12:42

## 2018-04-25 RX ADMIN — OXYCODONE HYDROCHLORIDE 10 MG: 5 TABLET ORAL at 16:46

## 2018-04-25 RX ADMIN — ACETAMINOPHEN 650 MG: 325 TABLET ORAL at 18:38

## 2018-04-25 RX ADMIN — DEXAMETHASONE SODIUM PHOSPHATE 4 MG: 4 INJECTION, SOLUTION INTRA-ARTICULAR; INTRALESIONAL; INTRAMUSCULAR; INTRAVENOUS; SOFT TISSUE at 13:32

## 2018-04-25 RX ADMIN — HYDROMORPHONE HYDROCHLORIDE 0.5 MG: 2 INJECTION INTRAMUSCULAR; INTRAVENOUS; SUBCUTANEOUS at 11:51

## 2018-04-25 RX ADMIN — EPHEDRINE SULFATE 12.5 MG: 50 INJECTION, SOLUTION INTRAVENOUS at 13:11

## 2018-04-25 RX ADMIN — ACETAMINOPHEN 975 MG: 325 TABLET ORAL at 10:41

## 2018-04-25 RX ADMIN — Medication 2 G: at 19:00

## 2018-04-25 RX ADMIN — STANDARDIZED SENNA CONCENTRATE AND DOCUSATE SODIUM 1 TABLET: 8.6; 5 TABLET, FILM COATED ORAL at 18:38

## 2018-04-25 RX ADMIN — GABAPENTIN 800 MG: 300 CAPSULE ORAL at 18:38

## 2018-04-25 RX ADMIN — ASPIRIN 325 MG: 325 TABLET, DELAYED RELEASE ORAL at 18:38

## 2018-04-25 RX ADMIN — BACLOFEN 10 MG: 10 TABLET ORAL at 21:13

## 2018-04-25 RX ADMIN — SODIUM CHLORIDE 125 ML/HR: 900 INJECTION, SOLUTION INTRAVENOUS at 22:24

## 2018-04-25 RX ADMIN — SODIUM CHLORIDE 125 ML/HR: 900 INJECTION, SOLUTION INTRAVENOUS at 16:49

## 2018-04-25 RX ADMIN — FAMOTIDINE 20 MG: 20 TABLET ORAL at 18:38

## 2018-04-25 RX ADMIN — Medication 2 G: at 12:54

## 2018-04-25 RX ADMIN — EPHEDRINE SULFATE 12.5 MG: 50 INJECTION, SOLUTION INTRAVENOUS at 13:50

## 2018-04-25 RX ADMIN — EPHEDRINE SULFATE 12.5 MG: 50 INJECTION, SOLUTION INTRAVENOUS at 13:36

## 2018-04-25 RX ADMIN — GABAPENTIN 800 MG: 300 CAPSULE ORAL at 21:13

## 2018-04-25 RX ADMIN — MIDAZOLAM HYDROCHLORIDE 1 MG: 1 INJECTION, SOLUTION INTRAMUSCULAR; INTRAVENOUS at 12:39

## 2018-04-25 RX ADMIN — BUPIVACAINE HYDROCHLORIDE 1.9 ML: 5 INJECTION, SOLUTION EPIDURAL; INTRACAUDAL at 12:50

## 2018-04-25 RX ADMIN — PREGABALIN 75 MG: 75 CAPSULE ORAL at 10:41

## 2018-04-25 RX ADMIN — SODIUM CHLORIDE, SODIUM LACTATE, POTASSIUM CHLORIDE, AND CALCIUM CHLORIDE 125 ML/HR: 600; 310; 30; 20 INJECTION, SOLUTION INTRAVENOUS at 10:29

## 2018-04-25 RX ADMIN — SODIUM CHLORIDE, SODIUM LACTATE, POTASSIUM CHLORIDE, AND CALCIUM CHLORIDE: 600; 310; 30; 20 INJECTION, SOLUTION INTRAVENOUS at 14:22

## 2018-04-25 RX ADMIN — PROPOFOL 50 MCG/KG/MIN: 10 INJECTION, EMULSION INTRAVENOUS at 13:04

## 2018-04-25 RX ADMIN — SODIUM CHLORIDE, SODIUM LACTATE, POTASSIUM CHLORIDE, AND CALCIUM CHLORIDE: 600; 310; 30; 20 INJECTION, SOLUTION INTRAVENOUS at 13:15

## 2018-04-25 RX ADMIN — EPHEDRINE SULFATE 12.5 MG: 50 INJECTION, SOLUTION INTRAVENOUS at 13:27

## 2018-04-25 RX ADMIN — OXYCODONE HYDROCHLORIDE 10 MG: 5 TABLET ORAL at 20:21

## 2018-04-25 RX ADMIN — FENTANYL CITRATE 50 MCG: 50 INJECTION, SOLUTION INTRAMUSCULAR; INTRAVENOUS at 12:39

## 2018-04-25 NOTE — PERIOP NOTES
PACU IN REPORT FROM ANESTHESIA    Verbal report received from   [address]   following [facility] for Procedure(s) (LRB):  RIGHT TOTAL HIP MAKOPLASTY DIRECT ANTERIOR  (Right) by  Nidhi Delgadillo MD.    Note the anesthesia record for medications given intraoperatively. Brief Initial Visual Assessment:    Patient Age: [] Infant(1-12mo)   [] Toddler(1-3yr)     [] (3-5yr)                     [] School-Age(5-13yr) [] Adolescent(13-18yr)  [] WCMGK(45-38XF)                         [x] Geriatric Adult(>65yr). Patient    [] Alert          [x] Calm & Cooperative           [] Anxious  Appearance: [x] Drowsy  [x] Sedated   [] Unresponsive     Oriented x 1           Airway:     [x] Patent                          [] Near-obstructed   [] Obstructed                                      [] Improved with head/airway repositioning                       Airway Adjuncts Present: [] Oral Airway   [] Nasal Trumpet         [] ETT     Respiratory  [x] Even      [] Labored      [] Shallow  Pattern:    [x] Non-Labored  [] VENT and/or respiratory assistance being provided. Skin:     [x] Pink [] Pale       [x] Warm [] Cool [] Cold   [x] Dry [] Moist [] Diaphoretic     Membranes:  [x] Pink [] Pale       [x] Moist [] Dry [] Crusty     Pain:   [x] No Acute Discomfort. 0 /10 Scale [x] Verbal Numeric   [] Moderate Discomfort.      [] V.A.S. [] Acute Discomfort.                [] A.N.V.    [] Chronic-Issue Related Discomfort.   [] F.L.A.C.C. Note E-MAR for medications administered. [] Ervin Cardenas/César    Note assessments documented in flowsheets; any assessment variants to be found in comments or narrative perioperative nurse notes.        Post-anesthesia care now assumed by Janis BERGMANN, RN-BC

## 2018-04-25 NOTE — PROGRESS NOTES
Anesthesiologist Dr. Lizzette Hagen notified for sign-out meeting PACU policy/Discharge requirements. Said she would complete prior to sign off to next MDA.

## 2018-04-25 NOTE — ANESTHESIA PROCEDURE NOTES
Spinal Block    Performed by: Arielle Monge  Authorized by: Mandy Murillo     Pre-procedure:   Indications: at surgeon's request and primary anesthetic  Preanesthetic Checklist: patient identified, risks and benefits discussed, anesthesia consent, site marked, patient being monitored and timeout performed      Spinal Block:   Patient Position:  Seated  Prep Region:  Lumbar  Prep: chlorhexidine      Location:  L3-4  Technique:  Single shot        Needle:   Needle Type:  Pencil-tip  Needle Gauge:  25 G  Attempts:  1      Events: CSF confirmed, no blood with aspiration and no paresthesia        Assessment:  Insertion:  Uncomplicated  Patient tolerance:  Patient tolerated the procedure well with no immediate complications

## 2018-04-25 NOTE — OP NOTES
OPERATIVE REPORT     Admit Date: 4/25/2018  Admit Diagnosis: DJD RIGHT HIP   Primary osteoarthritis of right hip  Preoperative Diagnosis: DJD RIGHT HIP   Postoperative Diagnosis: DJD RIGHT HIP     Procedure: Procedure(s):  RIGHT TOTAL HIP MAKOPLASTY DIRECT ANTERIOR   Surgeon: Cheng Lara MD  Assistant(s): Mercedez Powers PA-C  Anesthesia: Regional   Estimated Blood Loss: 150cc  Specimens: * No specimens in log *   Complications: None        INDICATIONS:    The patient is a 80 y.o., male who has complained of a long history of right hip pain / groin pain. The patient has failed conservative treatment to include medical management / therapy and presents for definitive operative care. Informed consent was obtained including a discussion of the risks and benefits, which include, but are not limited to, bleeding, infection, neurovascular damage, wound complications, pain and stiffness in the hip, periprosthetic loosening, fracture, dislocation and venous thrombo-embolic disease, the patient consented for the procedure. DESCRIPTION OF PROCEDURE:       The proper side and hip were identified and signed in the preoperative holding area. All questions were answered. After adequate anesthesia, the hip was prepped and draped in sterile fashion. The contralateral tracking array was placed. The patient was positioned supine on the operating room table. A direct anterior approach was used through skin and the tensor fascia. The interval between the Tensor Fascia and Sartorius was used and retractors were placed in an atraumatic fashion. The lateral femoral circumflex artery and vein were identified and coagulated. The proximal and distal capsule was identified and the anterior capsule excised. A tracking array was placed in the proximal greater Trochanter and just superior to the acetabulum. The leg length and offset were verified with the MAKOplasty. A standard neck cut was made according to the implant geometry. The femoral head was removed. Further soft tissue was debrided. Acetabular exposure was then obtained and the labrum was excised along with the foveal contents. Tracking points were established and acetabular mapping was performed. Once registration was complete and all soft tissues were removed the planned acetabular reamer was used. Adequate bone was resected according to the preop template. The final cup was then impacted in place with haptic guidance. The femur was then exposed, residual soft tissue was removed and the box osteotome was used along with the entry reamer to open the canal. Sequential broaching was started with the zero broach and broached up to the final implant size. The implant to the final implant size was placed and a trial reduction performed. Leg lengths and offset were verified. Fluoroscopy was used briefly to verify cup placement and stem size. The wound was copiously irrigated and the final head placed. Stability and leg lengths were assessed again and verified fluroscopically. The final implants were irrigated. The interval between the tensor and Sartorius was closed with 0-Vicryl, the sub-Q with 2-0 Vicryl, 4-0 monocryl and dermabond. The pin sites were closed with 4-0 Monocryl. A sterile dressing was applied. The patient was awoken from anesthesia and taken to the recovery room in a stable condiition. 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 IMPLANTS :     Implant Name Type Inv.  Item Serial No.  Lot No. LRB No. Used Action   STEM FEM  7 [id number] -- ACCOLADE II V40 - SN/A  STEM FEM  7 [id number] -- ACCOLADE II V40 N/A [employer] HOW [id number] Right 1 Implanted   INSERT POLY TRIDENT 0 DEG 36MM SIZE F - SN/A Joint Component INSERT POLY TRIDENT 0 DEG 36MM SIZE F N/A [employer] HOW [id number] Right 1 Implanted   SHELL ACET HOLE CLUSTER KATELYN 58MM F - SN/A Joint Component SHELL ACET HOLE CLUSTER KATELYN 58MM F N/A [employer] HOW [id number] Right 1 Implanted   HEAD FEM DELT V40 +2.5MM 36MM -- V40 BIOLOX - SN/A   HEAD FEM DELT V40 +2.5MM 36MM -- V40 BIOLOX N/A [employer] HOW [id number] Right 1 Implanted       JUSTIFICATION FOR SURGICAL ASSISTANT:   Surgical Assistant, was requried and necessary in this case, to help with soft tissue retraction, extremity positioning, equiment management, implant management, and wound closure.       Rebecca Shepard MD

## 2018-04-25 NOTE — IP AVS SNAPSHOT
[facility] 
 
 
 [address] [address] [facility] 
[phone] Patient: Azalia Spann MRN: [id number] About your hospitalization You were admitted on:  April 25, 2018 You last received care in the:  [facility] 4M POST SURG ORT 1 You were discharged on:  April 26, 2018 Why you were hospitalized Your primary diagnosis was:  Not on File Your diagnoses also included:  Primary Osteoarthritis Of Right Hip Follow-up Information Follow up With Details Comments Contact Info Goldy Ambriz MD   [address] [facility] [address] [facility] [address] 
[phone] [facility] Physical Therapy On 4/30/2018 Outpatient Physical Therapy - 10:00am [address] [place] [address] 
[phone] Discharge Orders None A check ese indicates which time of day the medication should be taken. My Medications START taking these medications Instructions Each Dose to Equal  
 Morning Noon Evening Bedtime  
 acetaminophen 500 mg tablet Commonly known as:  [address] [facility] Your last dose was: Your next dose is: Take 1-2 Tabs by mouth every six (6) hours as needed for Pain. Not to exceed 4,000mg in any 24 hour period  Indications: Pain 500-1000 mg  
    
   
   
   
  
 aspirin delayed-release 325 mg tablet Your last dose was: Your next dose is: Take 1 Tab by mouth two (2) times a day. 325 mg  
    
   
   
   
  
 ibuprofen 800 mg tablet Commonly known as:  MOTRIN Your last dose was: Your next dose is: Take 1 Tab by mouth every six (6) hours as needed for Pain. 800 mg  
    
   
   
   
  
 ondansetron 8 mg disintegrating tablet Commonly known as:  ZOFRAN ODT Your last dose was: Your next dose is: Take 0.5 Tabs by mouth every eight (8) hours as needed for Nausea. 4 mg oxyCODONE IR 5 mg immediate release tablet Commonly known as:  Cliff Melena Your last dose was: Your next dose is: Take 1-2 Tabs by mouth every four (4) hours as needed for Pain. Max Daily Amount: 60 mg. Indications: Pain 5-10 mg  
    
   
   
   
  
 traMADol 50 mg tablet Commonly known as:  ULTRAM  
   
Your last dose was: Your next dose is: Take 1 Tab by mouth every six (6) hours as needed for Pain (Take for breakthrough pain if Oxycodone is not working). Max Daily Amount: 200 mg. Indications: Pain, Post-op Pain, Diagnosis Hip and Knee Arthritis ICD 10 - M16.9  
 50 mg CONTINUE taking these medications Instructions Each Dose to Equal  
 Morning Noon Evening Bedtime  
 ascorbic acid (vitamin C) 500 mg tablet Commonly known as:  VITAMIN C Your last dose was: Your next dose is: Take 1,000 mg by mouth daily. 1000 mg  
    
   
   
   
  
 baclofen 10 mg tablet Commonly known as:  LIORESAL Your last dose was: Your next dose is: Take 10 mg by mouth three (3) times daily. 10 mg  
    
   
   
   
  
 gabapentin 800 mg tablet Commonly known as:  NEURONTIN Your last dose was: Your next dose is: Take 800 mg by mouth three (3) times daily. 800 mg  
    
   
   
   
  
 metFORMIN 500 mg tablet Commonly known as:  GLUCOPHAGE Your last dose was: Your next dose is: Take 500 mg by mouth daily (with breakfast). 500 mg PRESERVISION AREDS PO Your last dose was: Your next dose is: Take 1 Cap by mouth daily. 1 Cap PROAIR HFA 90 mcg/actuation inhaler Generic drug:  albuterol Your last dose was: Your next dose is: Take 2 Puffs by inhalation every four (4) hours as needed for Wheezing. 2 Puff VITAMIN B-12 500 mcg tablet Generic drug:  cyanocobalamin Your last dose was: Your next dose is: Take 500 mcg by mouth daily. 500 mcg VITAMIN D3 2,000 unit Tab Generic drug:  cholecalciferol (vitamin D3) Your last dose was: Your next dose is: Take 2 Tabs by mouth daily. 2 Tab  
    
   
   
   
  
 zolpidem 10 mg tablet Commonly known as:  AMBIEN Your last dose was: Your next dose is: Take 10 mg by mouth nightly as needed. 10 mg  
    
   
   
   
  
  
STOP taking these medications HYDROcodone-acetaminophen 7.5-325 mg per tablet Commonly known as:  [address] [place] [address] Where to Get Your Medications Information on where to get these meds will be given to you by the nurse or doctor. ! Ask your nurse or doctor about these medications  
  acetaminophen 500 mg tablet  
 aspirin delayed-release 325 mg tablet  
 ibuprofen 800 mg tablet  
 ondansetron 8 mg disintegrating tablet  
 oxyCODONE IR 5 mg immediate release tablet  
 traMADol 50 mg tablet Opioid Education Prescription Opioids: What You Need to Know: 
 
 
SPECIAL INSTRUCTIONS :  
 1. Do not bend greater than 90 degrees at the hip for 4 weeks following your discharge 2. Avoid exercises or activities which bring the leg out or away from the mid-line of the body. The surgical repair involves this muscle and it will require 4 weeks to heal. You may disregard these instructions for a direct anterior approach. 3. You may walk as tolerated and are encouraged to work daily on progressing your activities with a walker initially. 4. You may transition to a cane for walking 5-7 days from surgery once you feel safe. You may use a walker for longer periods if you feel unstable. Wound Care/ Dressing Changes: DRESSING :  
 
Aquacel Dressing : This may be removed during your first follow-up appointment in the office, 10 days after the date of your surgery. If there is no drainage, then a simple dressing may be used or no dressing at all. Other dressing options can be purchased over the counter at a local pharmacy or medical supply vendor. A porous adhesive dressing such as pictured above can be purchased at a local [facility] or [employer]. You only need to keep the incision covered for 7 days after showers. A dressing may be used for longer if there are issues with clothing clinging to the incision. Showering/ Bathing: You may shower with the aquacel dressing in place. This is left in place for 7-10 days following discharge from the hospital. If your incision is dry without drainage you may shower following your discharge home. After 7-10 days your aquacel dressing should be removed for showering. It is fine to have water run over the incision. Do not vigorously scrub your incision. Apply a clean, dry dressing after you have dried your incision. Do not take a bath or get into a swimming pool / [place] until you follow up with Dr. Rusty Mchugh. Do not soak your incision under water.  If there is continued drainage or you are concerned contact Dr Darryle Pam office prior to gisselle [phone] . Diet: 
You may advance to your regular diet as tolerated. Increase your clear liquid intake for the next 2-3 days. Increase your protein intake for 30 days to promote healing. Common protein rich foods include: meat, fish, yogurt, milk, cheese, eggs, soy, and nut products. If you are having difficulty consuming adequate protein, you may want to consider a protein supplement. One option is Ensure Active High Protein for Muscle Health 1-2 bottles per day for 30 days unless otherwise directed by your Primary Care Physician. This product can be purchased at your local grocery store or drug store and online. [place] Medication: 
 
 
1. You will be given prescriptions for pain medication when you are discharged from the hospital. The side effects of these medications can be substantial and the narcotic medications are not mandatory. You may substitute these medications with Tylenol or Alleve / Motrin. 2.  Please use the medications as prescribed. Pain medications may cause constipation- Colace twice daily and Miralax one scoop daily while taking the narcotic medication should help prevent constipation. Please discuss with your local pharmacist regarding increasing this dosage if constipation persists. Other possible side effects of pain medication are dizziness, headache, nausea, vomiting, and urinary retention. Discontinue the pain medication if you develop itching, rash, shortness of breath, or difficulties swallowing. If these symptoms become severe or are not relieved by discontinuing the medication, you should seek immediate medical attention. 3. Refills of pain medication are authorized during office hours only (8 AM- 5 PM  Monday thru Friday). Many of these medication will require you or a family member to pick-up a physical prescription at the office. 4. Medications other than antiinflammatories will not be called into the pharmacy after business hours. 5. Do not take Tylenol/Acetaminophen in addition to your pain medication as most pain medications already contain this ingredient. Do not exceed 4000mg of Tylenol/Acetaminophen per day. 6. You may resume the medication(s) you were taking prior to your surgery. Narcotics may change the effects of some antidepressant medication(s). If you have any questions about possible interactions between your regular medications and the pain medication, you should ask the pharmacist or contact the prescribing physician. 7. You will be discharged with prescriptions for additional pain medications (Tramadol or Toradol) and a medication for nausea and vomiting (Phenergan). You only need to fill these prescriptions if the primary pain medication is not working or you experiencing post-op nausea. 8. If you have constipation which is not improved by oral stool softeners then a Ducolax suppository should be purchased over the counter. 9. Continue the blood thinner (Aspirin or Lovenox) for a total of 30 days following surgery. Follow up appointment: 
 
Please call our office at [phone] for your follow up appointment. This should be scheduled 14 days following the date of surgery. Physical Therapy / Nursing: 
 
Physical Therapy following surgery will be arranged at home along with at home nursing care. They have specific instructions for rehab and wound care. .  
 
Returning to work: 
 
Normal return to work is 3-12 weeks following total hip replacement. Depending on your progression following surgery and specific job duties you may take longer for a full return to work. DRIVING You should not return to driving until you are off all narcotic pain medications and able to safely and quickly apply the brakes. This is normally 3-6 weeks for left hips and 4-8 weeks for right hip. Important Signs and Symptoms: 
 
If any of the following signs or symptoms occur, you should contact Dr. Eliu Hudson office. Please be advised if a problem arises which you feel requires immediate medical attention or you are unable to contact Dr. Eliu Hudson office you should seek immediate medical attention at the ER or other health care facility you have access to. 
 
1. A sudden increase in swelling and/or redness or warmth at the area your surgery was performed which isnt relieved by rest, ice, and elevation. 2. Oral temperature greater than 101 degrees for 12 hours or more which isnt relieved by an increase in fluid intake and taking 2 Tylenol every 4-6 hours. 3. Excessive drainage from your incisions, or drainage which hasnt stopped by 72 hours after your surgery. 4. Fever, chills, shortness of breath, chest pain, nausea, vomiting or other signs and symptoms which are of concern to you. frequently asked questions ? What should I take for pain? 
o In general you will be discharged with three medications for pain (Extra Strength Tylenol, Oxycodone 5mg and Tramadol). This may vary slightly depending on what you were taking in the hospital.  
? 1st Line  Extra Strength Tylenol 1-2 tablets (500-1000mg) every 4-6 hrs. 
? After 2 days this dose should not exceed 8 tablets per day ? This is the first and only medication you need to take. Initially you may need 2 tablets every 4 hours, but as your pain subsides, this will taper to 1 tablet every 6 hours. ? 2nd Line - Tramadol 50mg (1 tablet) every 8 hours ? 3rd Line  Oxycodone 1 (5mg) - 2 (10mg) every 4 hours (Or as directed), take these between Percocet doses if your pain is not below 4 / 10. This may be needed only for several days following your discharge. ? 4th Line  Add Alleve 220mg every 12 hours or Motrin 400mg (200mg x 2) every 8 hours ? When should I call for advice regarding my pain? o After 12 hours on the above regimen, if nothing is working call the office ([phone] or [phone]) or call my cell after hours [phone]. 
? Can I get refills? 
o Narcotic refills are provided for the first 6 weeks following surgery. ? I will generally try to taper down to a single narcotic medication by your two week appointment. o Try Tylenol 650mg along with Alleve 220mg or Motrin 200mg during the majority of the day. ? Save the narcotic pain medications for physical therapy (1 hour prior) and before sleeping at night. ? Keep in mind you need to discontinue these medications prior to returning to driving. ? Is swelling normal? 
o Almost everyone has some degree of swelling following surgery. o Following hip and knee replacement surgery, swelling can be normal below the incision for the first 1-2 weeks. ? This swelling peaks around 5-7 days after surgery. ? You may have some bruising around the back of the thigh, calf and even into the foot. ? What should I do for the swelling? 
o Keep the limb elevated. o Apply compression socks (knee high for total knees and up to the mid-thigh for total hips.  
o Heat or ice may be applied, choose the modality that makes you the most comfortable. ? How long should I remain on blood thinners following surgery? 
o Thirty days ? When can I drive? 
o Once you have stopped using regular narcotic pain medications (Percocet, Lortab, etc.) and can safely apply the brakes without hesitation. ? When can I shower? o 72hours following surgery if the incision is dry. 
o No submersion of the incision, bathing or swimming for 14 days following surgery or until cleared by Dr Salbador Slater. ? What do I do with the dressing when I shower? 
o The dressing can be removed. o The incision is sealed with Dermabond (Biologic glue) and except for wounds which are draining should be watertight. ? How active should I be following surgery? o Progress activities in moderation at your own pace.  
o Walk each day and set progressive goals with small increments (1st week  ½ block of walking, 2nd week  1 block, 3rd week  2 blocks, etc.) Please do not hesitate to call me at [phone] (cell phone) for questions following surgery - MD Anahy Hawkins MD 
Cell [phone] Virgen Gurrola PA-C Cell [phone] Medical Staff : Walter Hinojosa Office : [phone] Introducing [facility]! Yovani Sally introduces [employer] patient portal. Now you can access parts of your medical record, email your doctor's office, and request medication refills online. 1. In your internet browser, go to [email or web address] 2. Click on the First Time User? Click Here link in the Sign In box. You will see the New Member Sign Up page. 3. Enter your [employer] Access Code exactly as it appears below. You will not need to use this code after youve completed the sign-up process. If you do not sign up before the expiration date, you must request a new code. · [employer] Access Code: [email or web address] Expires: 5/30/2018 11:51 AM 
 
4. Enter the last four digits of your Social Security Number (xxxx) and Date of Birth (mm/dd/yyyy) as indicated and click Submit. You will be taken to the next sign-up page. 5. Create a [employer] ID. This will be your [employer] login ID and cannot be changed, so think of one that is secure and easy to remember. 6. Create a [employer] password. You can change your password at any time. 7. Enter your Password Reset Question and Answer. This can be used at a later time if you forget your password. 8. Enter your e-mail address. You will receive e-mail notification when new information is available in [address]. 9. Click Sign Up. You can now view and download portions of your medical record. 10. Click the Download Summary menu link to download a portable copy of your medical information. If you have questions, please visit the Frequently Asked Questions section of the MyChart website. Remember, [employer] is NOT to be used for urgent needs. For medical emergencies, dial 911. Now available from your iPhone and Android! Introducing Anthony Tovar As a [facility] patient, I wanted to make you aware of our electronic visit tool called Anthony Tovar. [employer] 24/7 allows you to connect within minutes with a medical provider 24 hours a day, seven days a week via a mobile device or tablet or logging into a secure website from your computer. You can access Anthony Tovar from anywhere in the [place]. A virtual visit might be right for you when you have a simple condition and feel like you just dont want to get out of bed, or cant get away from work for an appointment, when your regular [facility] provider is not available (evenings, weekends or holidays), or when youre out of town and need minor care. Electronic visits cost only $49 and if the [facility] provider determines a prescription is needed to treat your condition, one can be electronically transmitted to a nearby pharmacy*. Please take a moment to enroll today if you have not already done so. The enrollment process is free and takes just a few minutes. To enroll, please download the [employer] 24/7 lew to your tablet or phone, or visit [email or web address] to enroll on your computer. And, as an [address] [place] [address] patient with a [employer] account, the results of your visits will be scanned into your electronic medical record and your primary care provider will be able to view the scanned results. We urge you to continue to see your regular [facility] provider for your ongoing medical care.   And while your primary care provider may not be the one available when you seek a Anthony Tovar virtual visit, the peace of mind you get from getting a real diagnosis real time can be priceless. For more information on Anthony Tovar, view our Frequently Asked Questions (FAQs) at [email or web address]. Sincerely, 
 
Brien De Santiago MD 
Chief Medical Officer [address] *:  certain medications cannot be prescribed via Anthony Tovar Providers Seen During Your Hospitalization Provider Specialty Primary office phone Fred Land MD Orthopedic Surgery [phone] Your Primary Care Physician (PCP) Primary Care Physician Office Phone Office Fax Jamilah, [address] [phone] You are allergic to the following Allergen Reactions Demerol (Meperidine) Other (comments) Agitation and confusion Pcn (Penicillins) Swelling Recent Documentation Height Weight BMI Smoking Status 1.829 m 86.3 kg 25.8 kg/m2 Former Smoker Emergency Contacts Name Discharge Info Relation Home Work Mobile HaDelyplatPatientKeeper 69 CAREGIVER [3] Spouse [3] [address] CAREGIVER [3] Son [22] [phone] Patient Belongings The following personal items are in your possession at time of discharge: 
  Dental Appliances: None  Visual Aid: Glasses   Hearing Aids/Status: At home Please provide this summary of care documentation to your next provider. Signatures-by signing, you are acknowledging that this After Visit Summary has been reviewed with you and you have received a copy. Patient Signature:  ____________________________________________________________ Date:  ____________________________________________________________  
  
Jeyson Dunham Provider Signature:  ____________________________________________________________ Date:  ____________________________________________________________

## 2018-04-25 NOTE — ANESTHESIA PREPROCEDURE EVALUATION
Anesthetic History   No history of anesthetic complications            Review of Systems / Medical History  Patient summary reviewed and pertinent labs reviewed    Pulmonary    COPD: mild               Neuro/Psych   Within defined limits           Cardiovascular                  Exercise tolerance: <4 METS: From Pain     GI/Hepatic/Renal  Within defined limits              Endo/Other    Diabetes    Arthritis     Other Findings              Physical Exam    Airway  Mallampati: II  TM Distance: 4 - 6 cm  Neck ROM: normal range of motion   Mouth opening: Normal     Cardiovascular  Regular rate and rhythm,  S1 and S2 normal,  no murmur, click, rub, or gallop  Rhythm: regular  Rate: normal         Dental    Dentition: Edentulous     Pulmonary  Breath sounds clear to auscultation               Abdominal  GI exam deferred       Other Findings            Anesthetic Plan    ASA: 3  Anesthesia type: spinal            Anesthetic plan and risks discussed with: Patient

## 2018-04-25 NOTE — PROGRESS NOTES
4th floor notified    Cathern Aid via [address]) of admission and to anticipate arrival in   10-15   minutes to room 417. Family / caregiver update provided, questions answered. Location for room 417  provided  after PACU stay complete, directions provided for room location.

## 2018-04-25 NOTE — IP AVS SNAPSHOT
[address] 
[phone] Patient: Chelo Dempsey MRN: [id number] GSU:[id number] A check ese indicates which time of day the medication should be taken. My Medications START taking these medications Instructions Each Dose to Equal  
 Morning Noon Evening Bedtime  
 acetaminophen 500 mg tablet Commonly known as:  [address] [facility] Your last dose was: Your next dose is: Take 1-2 Tabs by mouth every six (6) hours as needed for Pain. Not to exceed 4,000mg in any 24 hour period  Indications: Pain 500-1000 mg  
    
   
   
   
  
 aspirin delayed-release 325 mg tablet Your last dose was: Your next dose is: Take 1 Tab by mouth two (2) times a day. 325 mg  
    
   
   
   
  
 ibuprofen 800 mg tablet Commonly known as:  MOTRIN Your last dose was: Your next dose is: Take 1 Tab by mouth every six (6) hours as needed for Pain. 800 mg  
    
   
   
   
  
 ondansetron 8 mg disintegrating tablet Commonly known as:  ZOFRAN ODT Your last dose was: Your next dose is: Take 0.5 Tabs by mouth every eight (8) hours as needed for Nausea. 4 mg  
    
   
   
   
  
 oxyCODONE IR 5 mg immediate release tablet Commonly known as:  Gareld Maid Your last dose was: Your next dose is: Take 1-2 Tabs by mouth every four (4) hours as needed for Pain. Max Daily Amount: 60 mg. Indications: Pain 5-10 mg  
    
   
   
   
  
 traMADol 50 mg tablet Commonly known as:  ULTRAM  
   
Your last dose was: Your next dose is: Take 1 Tab by mouth every six (6) hours as needed for Pain (Take for breakthrough pain if Oxycodone is not working). Max Daily Amount: 200 mg.  Indications: Pain, Post-op Pain, Diagnosis Hip and Knee Arthritis ICD 10 - M16.9  
 50 mg  
    
   
 CONTINUE taking these medications Instructions Each Dose to Equal  
 Morning Noon Evening Bedtime  
 ascorbic acid (vitamin C) 500 mg tablet Commonly known as:  VITAMIN C Your last dose was: Your next dose is: Take 1,000 mg by mouth daily. 1000 mg  
    
   
   
   
  
 baclofen 10 mg tablet Commonly known as:  LIORESAL Your last dose was: Your next dose is: Take 10 mg by mouth three (3) times daily. 10 mg  
    
   
   
   
  
 gabapentin 800 mg tablet Commonly known as:  NEURONTIN Your last dose was: Your next dose is: Take 800 mg by mouth three (3) times daily. 800 mg  
    
   
   
   
  
 metFORMIN 500 mg tablet Commonly known as:  GLUCOPHAGE Your last dose was: Your next dose is: Take 500 mg by mouth daily (with breakfast). 500 mg PRESERVISION AREDS PO Your last dose was: Your next dose is: Take 1 Cap by mouth daily. 1 Cap PROAIR HFA 90 mcg/actuation inhaler Generic drug:  albuterol Your last dose was: Your next dose is: Take 2 Puffs by inhalation every four (4) hours as needed for Wheezing. 2 Puff VITAMIN B-12 500 mcg tablet Generic drug:  cyanocobalamin Your last dose was: Your next dose is: Take 500 mcg by mouth daily. 500 mcg VITAMIN D3 2,000 unit Tab Generic drug:  cholecalciferol (vitamin D3) Your last dose was: Your next dose is: Take 2 Tabs by mouth daily. 2 Tab  
    
   
   
   
  
 zolpidem 10 mg tablet Commonly known as:  AMBIEN Your last dose was: Your next dose is: Take 10 mg by mouth nightly as needed. 10 mg  
    
   
   
   
  
  
STOP taking these medications HYDROcodone-acetaminophen 7.5-325 mg per tablet Commonly known as:  Ange Millard Where to Get Your Medications Information on where to get these meds will be given to you by the nurse or doctor. ! Ask your nurse or doctor about these medications  
  acetaminophen 500 mg tablet  
 aspirin delayed-release 325 mg tablet  
 ibuprofen 800 mg tablet  
 ondansetron 8 mg disintegrating tablet  
 oxyCODONE IR 5 mg immediate release tablet  
 traMADol 50 mg tablet

## 2018-04-25 NOTE — PROGRESS NOTES
\TRANSFER - IN REPORT:    Verbal report received from [address], RN(name) on Elo Mcgarry  being received from [facility]) for routine progression of care      Report consisted of patients Situation, Background, Assessment and   Recommendations(SBAR). Information from the following report(s) SBAR, Kardex, OR Summary, Procedure Summary, Intake/Output and MAR was reviewed with the receiving nurse. Opportunity for questions and clarification was provided. Assessment completed upon patients arrival to unit and care assumed.              Primary Nurse Humberto Gale RN and Amparo Layne RN performed a dual skin assessment on this patient No impairment noted  Anurag score is 19

## 2018-04-25 NOTE — PROGRESS NOTES
POST ANESTHESIA CARE    DISCHARGE / TRANSFER NOTE    Antoni Segura was   transfered   via   Bed   to   hospital room 417 . Patient was escorted by   nurse  . Patient verbalized   appreciation and was very pleased with care received    throughout their stay. Patient was discharged in     pleasant mood  . Pain at discharge/transfer was    0 /10. Discharge, medication and follow-up instructions were verbalized as understood prior to discharge  (if applicable for same-day procedures being discharged.)    All personal belongings have been returned to patient, and patient/family verbally confirm receiving belongings as all present. TRANSFER - OUT REPORT:    Verbal report given to   Saloni Ferrara  RN  receiving   Antoni Segura   and being transferred   to       [facility]    for routine post - op  Following Regional for Procedure(s) (LRB):  RIGHT TOTAL HIP MAKOPLASTY DIRECT ANTERIOR  (Right) by  Genevieve Coyne MD.    Patient Situation, Background, Assessment and Recommendations (SBAR) was provided and included information from the following SBAR report(s) (SBAR, OR Summary and MAR) which were reviewed with the receiving nurse. Lines:   Peripheral IV 04/25/18 Left Wrist (Active)   Site Assessment Clean, dry, & intact 4/25/2018  2:55 PM   Phlebitis Assessment 0 4/25/2018  2:55 PM   Infiltration Assessment 0 4/25/2018  2:55 PM   Dressing Status Clean, dry, & intact 4/25/2018  2:55 PM   Dressing Type Transparent;Tape 4/25/2018  2:55 PM   Hub Color/Line Status Patent; Infusing 4/25/2018  2:55 PM   Alcohol Cap Used Yes 4/25/2018 10:23 AM      Also Reviewed (checked if applicable):   [] NO ADDITIONAL REVIEWS  [] PCA Drug and Settings     [x] Cold Therapy with extra gels     [] On-Q @  ml/hr   [] Drains x       [] Significant Wound care/devices (e.g. Wound vac, etc.)     [] Holding pt in PACU awaiting bed availability - additional care provided and eMAR review  [] Vent Settings      [] Critical Care Drips  Contact Precautions: There are currently no Active Isolations  Patient transported with:  Registered Nurse    Additional Information: Pt resting NAD, VSS, Skin check by 2RN completed, Family at bedside and questions answered. After report, opportunity for questions and clarification was provided.     Signed: Salvador Coelho BSN RN-BC

## 2018-04-26 VITALS
HEART RATE: 90 BPM | DIASTOLIC BLOOD PRESSURE: 60 MMHG | WEIGHT: 190.26 LBS | SYSTOLIC BLOOD PRESSURE: 93 MMHG | HEIGHT: 72 IN | RESPIRATION RATE: 18 BRPM | BODY MASS INDEX: 25.77 KG/M2 | TEMPERATURE: 98 F | OXYGEN SATURATION: 95 %

## 2018-04-26 LAB
ANION GAP SERPL CALC-SCNC: 7 MMOL/L (ref 5–15)
BUN SERPL-MCNC: 11 MG/DL (ref 6–20)
BUN/CREAT SERPL: 14 (ref 12–20)
CALCIUM SERPL-MCNC: 7.8 MG/DL (ref 8.5–10.1)
CHLORIDE SERPL-SCNC: 103 MMOL/L (ref 97–108)
CO2 SERPL-SCNC: 29 MMOL/L (ref 21–32)
CREAT SERPL-MCNC: 0.77 MG/DL (ref 0.7–1.3)
GLUCOSE BLD STRIP.AUTO-MCNC: 147 MG/DL (ref 65–100)
GLUCOSE BLD STRIP.AUTO-MCNC: 220 MG/DL (ref 65–100)
GLUCOSE SERPL-MCNC: 135 MG/DL (ref 65–100)
HGB BLD-MCNC: 10.4 G/DL (ref 12.1–17)
POTASSIUM SERPL-SCNC: 4.1 MMOL/L (ref 3.5–5.1)
SERVICE CMNT-IMP: ABNORMAL
SERVICE CMNT-IMP: ABNORMAL
SODIUM SERPL-SCNC: 139 MMOL/L (ref 136–145)

## 2018-04-26 PROCEDURE — 85018 HEMOGLOBIN: CPT | Performed by: PHYSICIAN ASSISTANT

## 2018-04-26 PROCEDURE — 97165 OT EVAL LOW COMPLEX 30 MIN: CPT

## 2018-04-26 PROCEDURE — 80048 BASIC METABOLIC PNL TOTAL CA: CPT | Performed by: PHYSICIAN ASSISTANT

## 2018-04-26 PROCEDURE — 77030027138 HC INCENT SPIROMETER -A

## 2018-04-26 PROCEDURE — 97535 SELF CARE MNGMENT TRAINING: CPT

## 2018-04-26 PROCEDURE — 74011250637 HC RX REV CODE- 250/637: Performed by: PHYSICIAN ASSISTANT

## 2018-04-26 PROCEDURE — 97116 GAIT TRAINING THERAPY: CPT

## 2018-04-26 PROCEDURE — 74011636637 HC RX REV CODE- 636/637: Performed by: PHYSICIAN ASSISTANT

## 2018-04-26 PROCEDURE — 77010033678 HC OXYGEN DAILY

## 2018-04-26 PROCEDURE — 36415 COLL VENOUS BLD VENIPUNCTURE: CPT | Performed by: PHYSICIAN ASSISTANT

## 2018-04-26 PROCEDURE — 97161 PT EVAL LOW COMPLEX 20 MIN: CPT

## 2018-04-26 PROCEDURE — 97530 THERAPEUTIC ACTIVITIES: CPT

## 2018-04-26 PROCEDURE — 74011250636 HC RX REV CODE- 250/636: Performed by: PHYSICIAN ASSISTANT

## 2018-04-26 PROCEDURE — 82962 GLUCOSE BLOOD TEST: CPT

## 2018-04-26 RX ADMIN — INSULIN LISPRO 2 UNITS: 100 INJECTION, SOLUTION INTRAVENOUS; SUBCUTANEOUS at 08:01

## 2018-04-26 RX ADMIN — STANDARDIZED SENNA CONCENTRATE AND DOCUSATE SODIUM 1 TABLET: 8.6; 5 TABLET, FILM COATED ORAL at 08:13

## 2018-04-26 RX ADMIN — BACLOFEN 10 MG: 10 TABLET ORAL at 15:30

## 2018-04-26 RX ADMIN — ASPIRIN 325 MG: 325 TABLET, DELAYED RELEASE ORAL at 08:12

## 2018-04-26 RX ADMIN — FAMOTIDINE 20 MG: 20 TABLET ORAL at 08:13

## 2018-04-26 RX ADMIN — OXYCODONE HYDROCHLORIDE 10 MG: 5 TABLET ORAL at 00:01

## 2018-04-26 RX ADMIN — ACETAMINOPHEN 650 MG: 325 TABLET ORAL at 11:33

## 2018-04-26 RX ADMIN — BACLOFEN 10 MG: 10 TABLET ORAL at 08:12

## 2018-04-26 RX ADMIN — ACETAMINOPHEN 650 MG: 325 TABLET ORAL at 05:48

## 2018-04-26 RX ADMIN — OXYCODONE HYDROCHLORIDE 10 MG: 5 TABLET ORAL at 07:59

## 2018-04-26 RX ADMIN — CELECOXIB 200 MG: 100 CAPSULE ORAL at 08:11

## 2018-04-26 RX ADMIN — SODIUM CHLORIDE 125 ML/HR: 900 INJECTION, SOLUTION INTRAVENOUS at 05:55

## 2018-04-26 RX ADMIN — OXYCODONE HYDROCHLORIDE 10 MG: 5 TABLET ORAL at 11:32

## 2018-04-26 RX ADMIN — Medication 2 G: at 02:48

## 2018-04-26 RX ADMIN — POLYETHYLENE GLYCOL 3350 17 G: 17 POWDER, FOR SOLUTION ORAL at 08:11

## 2018-04-26 RX ADMIN — Medication 2 G: at 11:33

## 2018-04-26 RX ADMIN — METFORMIN HYDROCHLORIDE 500 MG: 500 TABLET, FILM COATED ORAL at 07:59

## 2018-04-26 RX ADMIN — INSULIN LISPRO 3 UNITS: 100 INJECTION, SOLUTION INTRAVENOUS; SUBCUTANEOUS at 11:32

## 2018-04-26 RX ADMIN — GABAPENTIN 800 MG: 300 CAPSULE ORAL at 08:12

## 2018-04-26 RX ADMIN — OXYCODONE HYDROCHLORIDE 5 MG: 5 TABLET ORAL at 04:11

## 2018-04-26 NOTE — PROGRESS NOTES
Nutrition Assessment:    RECOMMENDATIONS/INTERVENTION(S):   1. Continue regular diet, encouraging protein intake. 2. Ordered Ensure High Protein w/ lunch and dinner to support nutritional status for healing. 3. RD will continue to monitor labs/meds, wt, and intake. ASSESSMENT:   4/26: Received MD consult for general nutrition management and supplements for patient 1 day s/p right total hip makoplasty. PMHx of arthritis, lung cancer, COPD, type II DM, gout and diverticulitis.  with an A1C of 7.0. Patient reports a good appetite and had \"most\" of breakfast this morning. PTA, the patient reports some decreased appetite and intake x 3 months due to hip pain but has not noticed weight loss. Author observed mild temporal, clavicle and dorsal muscle wasting. Unsure of nutrition vs. sarcogenic etiology of wasting. Patient reports one of his daughter made and placed many frozen meals in his freezer for after discharge. Author, patient and daughter discussed nutrition needs for wound healing, including increased protein requirements. Patient and family understanding and receptive. Patient stated he had some Ensure and another protein drink from [employer] at home as well to support nutrition. SUBJECTIVE/OBJECTIVE:   Diet Order: Regular  % Eaten:  No data found.     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    Chronic obstructive pulmonary disease (HCC)     emphysema    Chronic pain     scar tissue in chest, neuropathy    Diabetes ([address]     Diverticulitis     Frequent falls     Gout     Insomnia     Lymphoma ([address] 2008    Had chemotherapy    Macular degeneration of right eye     Neuropathy     Paget's disease of bone     Skin cancer     Thromboembolus ([facility] 1988    right arm/ subclavian after injury to right arm       Chemistries:  Lab Results   Component Value Date/Time    Sodium 139 04/26/2018 03:35 AM    Potassium 4.1 04/26/2018 03:35 AM    Chloride 103 04/26/2018 03:35 AM    CO2 29 04/26/2018 03:35 AM    Anion gap 7 04/26/2018 03:35 AM    Glucose 135 (H) 04/26/2018 03:35 AM    BUN 11 04/26/2018 03:35 AM    Creatinine 0.77 04/26/2018 03:35 AM    BUN/Creatinine ratio 14 04/26/2018 03:35 AM    GFR est AA >60 04/26/2018 03:35 AM    GFR est non-AA >60 04/26/2018 03:35 AM    Calcium 7.8 (L) 04/26/2018 03:35 AM    AST (SGOT) 14 (L) 04/11/2018 02:20 PM    Alk. phosphatase 48 04/11/2018 02:20 PM    Protein, total 6.8 04/11/2018 02:20 PM    Albumin 3.7 04/11/2018 02:20 PM    Globulin 3.1 04/11/2018 02:20 PM    A-G Ratio 1.2 04/11/2018 02:20 PM    ALT (SGPT) 18 04/11/2018 02:20 PM      Anthropometrics: Height: 6' (182.9 cm) Weight: 86.3 kg (190 lb 4.1 oz)    IBW (%IBW): 81 kg (178 lb 9.2 oz) (106.54 %) UBW (%UBW): 86.2 kg (190 lb) (100.14 %)    BMI: Body mass index is 25.8 kg/(m^2). This BMI is indicative of:   [] Underweight    [x] Normal- for advanced age    [] Overweight    []  Obesity    []  Extreme Obesity (BMI>40)  Estimated Nutrition Needs (Based on): 2081 Kcals/day (BMR 1604 x 1.3 AF) , 112 g (1.3g/kg actual BW) Protein  Carbohydrate: At Least 130 g/day  Fluids: 2036-0639 mL/day (1 ml/kcal)    Last BM: 4/24   []Active     []Hyperactive  []Hypoactive       [] Absent   BS-not charted  Skin:    [] Intact   [x] Incision-right hip  [] Breakdown   [] DTI   [] Tears/Excoriation/Abrasion  []Edema [] Other:    Wt Readings from Last 30 Encounters:   04/26/18 86.3 kg (190 lb 4.1 oz)   04/11/18 88.3 kg (194 lb 10.7 oz)   07/05/12 95.3 kg (210 lb)      NUTRITION DIAGNOSES:   Problem:  Increased nutrient needs     Etiology: related to increased protein needs with wound healing     Signs/Symptoms: as evidenced by 1 day s/p right total hip makoplasty NUTRITION INTERVENTIONS:  Meals/Snacks: General/healthful diet,commercial supplements                  GOAL:   Patient to consumed 1 protein item and >/=75% of meals and ONS BID in the the next 3-5 days    Cultural, Restorationist, or Ethnic Dietary Needs:       EDUCATION & DISCHARGE NEEDS:    [] None Identified   [x] Identified and Education Provided/Documented   [] Identified and Pt declined/was not appropriate      [x] Interdisciplinary Care Plan Reviewed/Documented    [x] Discharge Needs: see discharge instructions   [] No Nutrition Related Discharge Needs    NUTRITION RISK:   Pt Is At Nutrition Risk  [x]     No Nutrition Risk Identified  []       PT SEEN FOR:    [x]  MD Consult: []Calorie Count      []Diabetic Diet Education        []Diet Education     []Electrolyte Management     [x]General Nutrition Management and Supplements     []Management of Tube Feeding     []TPN Recommendations    []  RN Referral:  []MST score >=2     []Enteral/Parenteral Nutrition PTA     []Pregnant: Gestational DM or Multigestation                 [] Pressure Ulcer    []  Low BMI      []  Length of Stay       [] Dysphagia Diet         [] Ventilator  []  Follow-up     Previous Recommendations:   [] Implemented          [] Not Implemented          [x] Not Applicable    Previous Goal:   [] Met              [] Progressing Towards Goal              [] Not Progressing Towards Goal   [x] Not Applicable            Ying Mittal, [address] [place] [address]  Pager [phone]

## 2018-04-26 NOTE — DISCHARGE INSTRUCTIONS
TOTAL HIP DISCHARGE INSTRUCTIONS    Patient: Brigitte Weaver MRN: [id number]  SSN: [id number]              Please take the time to review the following instructions before you leave the hospital and use them as guidelines during your recovery from surgery. If you have any questions you may contact my office at [phone]. After hours or during the weekend you may reach me personally at [phone] if there is an emergency. SPECIAL INSTRUCTIONS :   1. Do not bend greater than 90 degrees at the hip for 4 weeks following your discharge  2. Avoid exercises or activities which bring the leg out or away from the mid-line of the body. The surgical repair involves this muscle and it will require 4 weeks to heal. You may disregard these instructions for a direct anterior approach. 3. You may walk as tolerated and are encouraged to work daily on progressing your activities with a walker initially. 4. You may transition to a cane for walking 5-7 days from surgery once you feel safe. You may use a walker for longer periods if you feel unstable. Wound Care/ Dressing Changes:      DRESSING :     Aquacel Dressing : This may be removed during your first follow-up appointment in the office, 10 days after the date of your surgery. If there is no drainage, then a simple dressing may be used or no dressing at all. Other dressing options can be purchased over the counter at a local pharmacy or medical supply vendor. A porous adhesive dressing such as pictured above can be purchased at a local [facility] or [email or web address]. You only need to keep the incision covered for 7 days after showers. A dressing may be used for longer if there are issues with clothing clinging to the incision. Showering/ Bathing: You may shower with the aquacel dressing in place.  This is left in place for 7-10 days following discharge from the hospital. If your incision is dry without drainage you may shower following your discharge home.  After 7-10 days your aquacel dressing should be removed for showering. It is fine to have water run over the incision. Do not vigorously scrub your incision. Apply a clean, dry dressing after you have dried your incision. Do not take a bath or get into a swimming pool / [place] until you follow up with Dr. Bruce Poole. Do not soak your incision under water. If there is continued drainage or you are concerned contact Dr Jackie Lao office prior to showering [phone] . Diet:  You may advance to your regular diet as tolerated. Increase your clear liquid intake for the next 2-3 days. Increase your protein intake for 30 days to promote healing. Common protein rich foods include: meat, fish, yogurt, milk, cheese, eggs, soy, and nut products. If you are having difficulty consuming adequate protein, you may want to consider a protein supplement. One option is Ensure Active High Protein for Muscle Health 1-2 bottles per day for 30 days unless otherwise directed by your Primary Care Physician. This product can be purchased at your local grocery store or drug store and online. [employer]ila [employer]            Medication:      1. You will be given prescriptions for pain medication when you are discharged from the hospital. The side effects of these medications can be substantial and the narcotic medications are not mandatory. You may substitute these medications with Tylenol or Alleve / Motrin. 2.  Please use the medications as prescribed. Pain medications may cause constipation- Colace twice daily and Miralax one scoop daily while taking the narcotic medication should help prevent constipation. Please discuss with your local pharmacist regarding increasing this dosage if constipation persists. Other possible side effects of pain medication are dizziness, headache, nausea, vomiting, and urinary retention.   Discontinue the pain medication if you develop itching, rash, shortness of breath, or difficulties swallowing. If these symptoms become severe or are not relieved by discontinuing the medication, you should seek immediate medical attention. 3. Refills of pain medication are authorized during office hours only (8 AM- 5 PM  Monday thru Friday). Many of these medication will require you or a family member to pick-up a physical prescription at the office. 4. Medications other than antiinflammatories will not be called into the pharmacy after business hours. 5. Do not take Tylenol/Acetaminophen in addition to your pain medication as most pain medications already contain this ingredient. Do not exceed 4000mg of Tylenol/Acetaminophen per day. 6. You may resume the medication(s) you were taking prior to your surgery. Narcotics may change the effects of some antidepressant medication(s). If you have any questions about possible interactions between your regular medications and the pain medication, you should ask the pharmacist or contact the prescribing physician. 7. You will be discharged with prescriptions for additional pain medications (Tramadol or Toradol) and a medication for nausea and vomiting (Phenergan). You only need to fill these prescriptions if the primary pain medication is not working or you experiencing post-op nausea. 8. If you have constipation which is not improved by oral stool softeners then a Ducolax suppository should be purchased over the counter. 9. Continue the blood thinner (Aspirin or Lovenox) for a total of 30 days following surgery. Follow up appointment:    Please call our office at [phone] for your follow up appointment. This should be scheduled 14 days following the date of surgery. Physical Therapy / Nursing:    Physical Therapy following surgery will be arranged at home along with at home nursing care. They have specific instructions for rehab and wound care.  .     Returning to work:    Normal return to work is 3-12 weeks following total hip replacement. Depending on your progression following surgery and specific job duties you may take longer for a full return to work. DRIVING    You should not return to driving until you are off all narcotic pain medications and able to safely and quickly apply the brakes. This is normally 3-6 weeks for left hips and 4-8 weeks for right hip. Important Signs and Symptoms:    If any of the following signs or symptoms occur, you should contact Dr. Efrain Ewing office. Please be advised if a problem arises which you feel requires immediate medical attention or you are unable to contact Dr. Efrain Ewing office you should seek immediate medical attention at the ER or other health care facility you have access to.    1. A sudden increase in swelling and/or redness or warmth at the area your surgery was performed which isnt relieved by rest, ice, and elevation. 2. Oral temperature greater than 101 degrees for 12 hours or more which isnt relieved by an increase in fluid intake and taking 2 Tylenol every 4-6 hours. 3. Excessive drainage from your incisions, or drainage which hasnt stopped by 72 hours after your surgery. 4. Fever, chills, shortness of breath, chest pain, nausea, vomiting or other signs and symptoms which are of concern to you. frequently asked questions   What should I take for pain?  o In general you will be discharged with three medications for pain (Extra Strength Tylenol, Oxycodone 5mg and Tramadol). This may vary slightly depending on what you were taking in the hospital.   - 1st Line - Extra Strength Tylenol 1-2 tablets (500-1000mg) every 4-6 hrs.  After 2 days this dose should not exceed 8 tablets per day   This is the first and only medication you need to take. Initially you may need 2 tablets every 4 hours, but as your pain subsides, this will taper to 1 tablet every 6 hours.   - 2nd Line - Tramadol 50mg (1 tablet) every 8 hours  - 3rd Line - Oxycodone 1 (5mg) - 2 (10mg) every 4 hours (Or as directed), take these between Percocet doses if your pain is not below 4 / 10. This may be needed only for several days following your discharge. - 4th Line - Add Alleve 220mg every 12 hours or Motrin 400mg (200mg x 2) every 8 hours   When should I call for advice regarding my pain?  o After 12 hours on the above regimen, if nothing is working call the office ([phone] or [phone]) or call my cell after hours [phone].  Can I get refills?  o Narcotic refills are provided for the first 6 weeks following surgery. - I will generally try to taper down to a single narcotic medication by your two week appointment. o Try Tylenol 650mg along with Alleve 220mg or Motrin 200mg during the majority of the day. - Save the narcotic pain medications for physical therapy (1 hour prior) and before sleeping at night. - Keep in mind you need to discontinue these medications prior to returning to driving.  Is swelling normal?  o Almost everyone has some degree of swelling following surgery. o Following hip and knee replacement surgery, swelling can be normal below the incision for the first 1-2 weeks. - This swelling peaks around 5-7 days after surgery.   - You may have some bruising around the back of the thigh, calf and even into the foot.  What should I do for the swelling?  o Keep the limb elevated. o Apply compression socks (knee high for total knees and up to the mid-thigh for total hips.   o Heat or ice may be applied, choose the modality that makes you the most comfortable.  How long should I remain on blood thinners following surgery?  o Thirty days   When can I drive?  o Once you have stopped using regular narcotic pain medications (Percocet, Lortab, etc.) and can safely apply the brakes without hesitation.  When can I shower?   o 72hours following surgery if the incision is dry.  o No submersion of the incision, bathing or swimming for 14 days following surgery or until cleared by Dr Rupesh Cyr.  What do I do with the dressing when I shower?  o The dressing can be removed. o The incision is sealed with Dermabond (Biologic glue) and except for wounds which are draining should be watertight.  How active should I be following surgery?   o Progress activities in moderation at your own pace.   o Walk each day and set progressive goals with small increments (1st week - ½ block of walking, 2nd week - 1 block, 3rd week - 2 blocks, etc.)    Please do not hesitate to call me at [phone] (cell phone) for questions following surgery - MD Nevin Mclcure MD  Cell [phone]  Latisha Whitfield PA-C  Cell [phone]  Medical Staff : Lori Miller  Office : [phone]

## 2018-04-26 NOTE — ROUTINE PROCESS
Bedside shift change report given to [facility] (oncoming nurse) by [facility] (offgoing nurse). Report included the following information SBAR, Kardex, OR Summary, Intake/Output and MAR.

## 2018-04-26 NOTE — PROGRESS NOTES
Zolpidem Adjustment    The dose of zolpidem (Ambien) and other nonbenzodiazepines is automatically adjusted for female patients or patients 72years of age or older. For patients with a greater home dose than recommended, the pharmacist added a comment to the order that the dose may be repeated if no effect after one hour.

## 2018-04-26 NOTE — PROGRESS NOTES
Problem: Mobility Impaired (Adult and Pediatric)  Goal: *Acute Goals and Plan of Care (Insert Text)  Physical Therapy Goals  Initiated 4/26/2018    1. Patient will move from supine to sit and sit to supine , scoot up and down and roll side to side in bed with independence within 4 days. 2. Patient will perform sit to stand with modified independence within 4 days. 3. Patient will ambulate with modified independence for 200 feet with the least restrictive device within 4 days. 4. Patient will ascend/descend 1 stairs with  handrail(s) with modified independence within 4 days. 5. Patient will verbalize and demonstrate understanding of anterior precautions per protocol within 4 days. 6. Patient will perform all home exercise program per protocol with independence within 4 days. physical Therapy EVALUATION  Patient: Timmy Lieberman (35 y.o. male)  Date: 4/26/2018  Primary Diagnosis: DJD RIGHT HIP   Primary osteoarthritis of right hip  Procedure(s) (LRB):  RIGHT TOTAL HIP MAKOPLASTY DIRECT ANTERIOR  (Right) 1 Day Post-Op   Precautions:   WBAT    ASSESSMENT :  Based on the objective data described below, the patient presents with difficulty ambulation. Sit on the edge of bed min assist, sit to stand CGA, ambulate with rolling walker out  On the 4th floor hallway CGA. Decreased pace, no loss of balance. Assisted in and out of the bathroom CGA. Patient was in the bathroom but only passing gas assisted up on the chair. Educate and  Instructed patient to continue active range of motion exercise on both legs while up on chair or on bed. Reviewed hip anterior precautions and exercise protocol with the patient verbalized understanding. Patient has one step to enter the house offered to do stairs now but patient asked to do it later because of pain on his hip. Daughter in the room during the entire therapy evaluation and agreed with all goals set for the patient.   Activated chair alarm and notified nurse who agreed to monitor patient. Patient will benefit from skilled intervention to address the above impairments. Patients rehabilitation potential is considered to be Excellent  Factors which may influence rehabilitation potential include:   []         None noted  []         Mental ability/status  []         Medical condition  []         Home/family situation and support systems  []         Safety awareness  [x]         Pain tolerance/management  []         Other:      PLAN :  Recommendations and Planned Interventions:  [x]           Bed Mobility Training             []    Neuromuscular Re-Education  [x]           Transfer Training                   []    Orthotic/Prosthetic Training  [x]           Gait Training                         []    Modalities  [x]           Therapeutic Exercises           []    Edema Management/Control  [x]           Therapeutic Activities            []    Patient and Family Training/Education  []           Other (comment):    Frequency/Duration: Patient will be followed by physical therapy  twice daily to address goals. Discharge Recommendations: Please refer to MD recommendations for disposition. Further Equipment Recommendations for Discharge: already has DME's     SUBJECTIVE:   Patient stated I would like to sit on the commode.     OBJECTIVE DATA SUMMARY:   HISTORY:    Past Medical History:   Diagnosis Date    Adverse effect of anesthesia     woke up during surgery     Arthritis     Cancer ([facility] [address] 2009    squamous cell lung- had RUL lobectomy    Chronic obstructive pulmonary disease (HCC)     emphysema    Chronic pain     scar tissue in chest, neuropathy    Diabetes ([address]     Diverticulitis     Frequent falls     Gout     Insomnia     Lymphoma ([address] 2008    Had chemotherapy    Macular degeneration of right eye     Neuropathy     Paget's disease of bone     Skin cancer     Thromboembolus ([facility] [address] 1988    right arm/ subclavian after injury to right arm     Past Surgical History: Procedure Laterality Date    CHEST SURGERY PROCEDURE UNLISTED  05/2009    Right upper lobe resection for cancer    HX CATARACT REMOVAL Bilateral     HX CHOLECYSTECTOMY      HX GI  1967    exploratory surgery, removed gall bladder    HX HERNIA REPAIR Left 2012    hernia repair x2   Maddie Thomas HERNIA REPAIR  1953   Ernie Screws  [id number], [phone]    lower back surgery x4    TOTAL HIP ARTHROPLASTY Left 2012     Prior Level of Function/Home Situation: modified independent with single point cane and rolling walker, lives family in a 2 level house bedroom first level and has 1 step to stefany. Personal factors and/or comorbidities impacting plan of care:     Home Situation  Home Environment: Private residence  # Steps to Enter: 1  Rails to Enter: No  One/Two Story Residence: Two story, live on 1st floor  Lift Chair Available: No  Living Alone: No  Support Systems: Child(ezio), Family member(s), Friends \ neighbors, Spouse/Significant Other/Partner  Patient Expects to be Discharged to[de-identified] Private residence  Current DME Used/Available at Home: Pavithra Haney, sam, Cane, straight    EXAMINATION/PRESENTATION/DECISION MAKING:   Critical Behavior:  Neurologic State: Eyes open spontaneously, Sleeping           Hearing: Auditory  Auditory Impairment: Hearing aid(s), Hard of hearing, bilateral  Hearing Aids/Status:  At home    Range Of Motion:  AROM: Generally decreased, functional           PROM: Within functional limits           Strength:    Strength: Generally decreased, functional                    Tone & Sensation:                                  Coordination:  Coordination: Within functional limits  Vision:      Functional Mobility:  Bed Mobility:  Rolling: Minimum assistance  Supine to Sit: Minimum assistance  Sit to Supine: Minimum assistance  Scooting: Minimum assistance  Transfers:  Sit to Stand: Contact guard assistance  Stand to Sit: Contact guard assistance  Stand Pivot Transfers: Contact guard assistance     Bed to Chair: Contact guard assistance              Balance:   Sitting: Intact; High guard  Standing: Impaired;Pull to stand; With support  Standing - Static: Fair  Standing - Dynamic : Fair  Ambulation/Gait Training:  Distance (ft): 80 Feet (ft)  Assistive Device: Walker, rolling;Gait belt  Ambulation - Level of Assistance: Contact guard assistance     Gait Description (WDL): Exceptions to WDL  Gait Abnormalities: Antalgic  Right Side Weight Bearing: As tolerated     Base of Support: Widened     Speed/Enriqueta: Pace decreased (<100 feet/min)                                     Therapeutic Exercises:    Instructed patient to continue active range of motion exercise on both legs while up on chair or on bed per Dr. Prashanth Louise exercise protocol. Functional Measure:  Tinetti test:    Sitting Balance: 1  Arises: 1  Attempts to Rise: 2  Immediate Standing Balance: 1  Standing Balance: 1  Nudged: 2  Eyes Closed: 1  Turn 360 Degrees - Continuous/Discontinuous: 1  Turn 360 Degrees - Steady/Unsteady: 1  Sitting Down: 2  Balance Score: 13  Indication of Gait: 1  R Step Length/Height: 1  L Step Length/Height: 1  R Foot Clearance: 1  L Foot Clearance: 1  Step Symmetry: 1  Step Continuity: 1  Path: 1  Trunk: 1  Walking Time: 0  Gait Score: 9  Total Score: 22       Tinetti Test and G-code impairment scale:  Percentage of Impairment CH    0%   CI    1-19% CJ    20-39% CK    40-59% CL    60-79% CM    80-99% CN     100%   Tinetti  Score 0-28 28 23-27 17-22 12-16 6-11 1-5 0       Tinetti Tool Score Risk of Falls  <19 = High Fall Risk  19-24 = Moderate Fall Risk  25-28 = Low Fall Risk  Tinetti ME. Performance-Oriented Assessment of Mobility Problems in Elderly Patients. [facility]; [id number].  (Scoring Description: PT Bulletin Feb. 10, 1993)    Older adults: Angelo Cerna et al, 2009; n = [address] elderly evaluated with ABC, VENUS, ADL, and IADL)  · Mean VENUS score for males aged 69-68 years = 26.21(3.40)  · Mean VENUS score for females age 69-68 years = 25.16(4.30)  · Mean VENUS score for males over 80 years = 23.29(6.02)  · Mean VENUS score for females over 80 years = 17.20(8.32)       G codes: In compliance with CMSs Claims Based Outcome Reporting, the following G-code set was chosen for this patient based on their primary functional limitation being treated: The outcome measure chosen to determine the severity of the functional limitation was the tinetti with a score of 22/28 which was correlated with the impairment scale. ? Mobility - Walking and Moving Around:     - CURRENT STATUS: CJ - 20%-39% impaired, limited or restricted    - GOAL STATUS: CI - 1%-19% impaired, limited or restricted    - D/C STATUS:  ---------------To be determined---------------      Physical Therapy Evaluation Charge Determination   History Examination Presentation Decision-Making   LOW Complexity : Zero comorbidities / personal factors that will impact the outcome / POC LOW Complexity : 1-2 Standardized tests and measures addressing body structure, function, activity limitation and / or participation in recreation  LOW Complexity : Stable, uncomplicated  Other outcome measures tinetti  MEDIUM      Based on the above components, the patient evaluation is determined to be of the following complexity level: LOW     Pain:                    Activity Tolerance:   Good. Please refer to the flowsheet for vital signs taken during this treatment. After treatment:   [x]         Patient left in no apparent distress sitting up in chair  []         Patient left in no apparent distress in bed  [x]         Call bell left within reach  [x]         Nursing notified  [x]         Caregiver present  [x]         Chair alarm activated    COMMUNICATION/EDUCATION:   The patients plan of care was discussed with: Registered Nurse and patient. [x]         Fall prevention education was provided and the patient/caregiver indicated understanding.   [x]         Patient/family have participated as able in goal setting and plan of care. [x]         Patient/family agree to work toward stated goals and plan of care. []         Patient understands intent and goals of therapy, but is neutral about his/her participation. []         Patient is unable to participate in goal setting and plan of care. Thank you for this referral.  Sigifredo Tyson, PT,WCC.    Time Calculation: 27 mins

## 2018-04-26 NOTE — PROGRESS NOTES
Problem: Hip Replacement: Post Op Day 1  Goal: *Demonstrates progressive activity  Outcome: Progressing Towards Goal  OOBC and ambulated nearly 80ft with Rehab. Tolerating well  Goal: *Optimal pain control at patient's stated goal  Outcome: Progressing Towards Goal  Tolerating prn pain regime, current pain level is 0. Pt education on prn analgesics reinforced. Goal: *Discharge plan identified  Outcome: Progressing Towards Goal  Discharge to home with OP therapy when clears Rehab.   Potential for later this afternoon or tomorrow AM.

## 2018-04-26 NOTE — PROGRESS NOTES
Problem: Self Care Deficits Care Plan (Adult)  Goal: *Therapy Goal (Edit Goal, Insert Text)  Occupational Therapy EVALUATION/discharge  Patient: Trung Gleason (16 y.o. male)  Date: 4/26/2018  Primary Diagnosis: DJD RIGHT HIP   Primary osteoarthritis of right hip  Procedure(s) (LRB):  RIGHT TOTAL HIP MAKOPLASTY DIRECT ANTERIOR  (Right) 1 Day Post-Op   Precautions:   WBAT    ASSESSMENT:   Based on the objective data described below, the patient presents at [facility] to supervision level with self-care and mobility. Patient has all DME and AE from previous surgeries. Patient & wife live with son. He will have assistance at home. Further skilled acute occupational therapy is not indicated at this time. Discharge Recommendations: home  Further Equipment Recommendations for Discharge: none noted at this time      SUBJECTIVE:   Patient stated I've been using this stuff for years.  (adaptive equip)    OBJECTIVE DATA SUMMARY:   HISTORY:   Past Medical History:   Diagnosis Date    Adverse effect of anesthesia     woke up during surgery     Arthritis     Cancer ([address] 2009    squamous cell lung- had RUL lobectomy    Chronic obstructive pulmonary disease (HCC)     emphysema    Chronic pain     scar tissue in chest, neuropathy    Diabetes ([address]     Diverticulitis     Frequent falls     Gout     Insomnia     Lymphoma ([address] 2008    Had chemotherapy    Macular degeneration of right eye     Neuropathy     Paget's disease of bone     Skin cancer     Thromboembolus ([address] 1988    right arm/ subclavian after injury to right arm     Past Surgical History:   Procedure Laterality Date    CHEST SURGERY PROCEDURE UNLISTED  05/2009    Right upper lobe resection for cancer    HX CATARACT REMOVAL Bilateral     HX CHOLECYSTECTOMY      HX GI  1967    exploratory surgery, removed gall bladder    HX HERNIA REPAIR Left 2012    hernia repair x2    [facility]  [id number], [id number]    lower back surgery x4    TOTAL HIP ARTHROPLASTY Left 2012       Prior Level of Function/Environment/Context: Lives with his wife at his son's house, a mother-in-law suite. Mod I to independent level with all aspects of self-care. Occupations in which the patient is/was successful, what are the barriers preventing that success:   Performance Patterns (routines, roles, habits, and rituals):   Personal Interests and/or values:   Expanded or extensive additional review of patient history:     Home Situation  Home Environment: Private residence  # Steps to Enter: 1  Rails to Enter: No  One/Two Story Residence: Two story, live on 1st floor  Lift Chair Available: No  Living Alone: No  Support Systems: Child(ezio), Family member(s), Friends \ neighbors, Spouse/Significant Other/Partner  Patient Expects to be Discharged to[de-identified] Private residence  Current DME Used/Available at Home: [place], [place], straight  []  Right hand dominant   []  Left hand dominant    EXAMINATION OF PERFORMANCE DEFICITS:  Cognitive/Behavioral Status:  Neurologic State: Alert  Orientation Level: Oriented X4                Skin: intact    Edema: slight edema noted in B LEs from surgery    Hearing: Auditory  Auditory Impairment: Hearing aid(s), Hard of hearing, bilateral  Hearing Aids/Status: At home            Range of Motion:    AROM: Within functional limits  PROM: Within functional limits                      Strength:    Strength: Within functional limits                Coordination:  Coordination: Within functional limits  Fine Motor Skills-Upper: Left Intact; Right Intact    Gross Motor Skills-Upper: Left Intact; Right Intact         Balance:  Sitting: Intact  Standing: Intact; With support  Standing - Static: Fair  Standing - Dynamic : Fair    Functional Mobility and Transfers for ADLs:  Bed Mobility:  Rolling: Supervision  Supine to Sit: Minimum assistance  Sit to Supine: Stand-by assistance  Scooting: Minimum assistance    Transfers:  Sit to Stand: Supervision  Stand to Sit: Supervision  Bed to Chair: Supervision  Toilet Transfer : Supervision    ADL Assessment:  Feeding: Independent    Oral Facial Hygiene/Grooming: Independent    Bathing: Minimum assistance    Upper Body Dressing: Independent    Lower Body Dressing: Modified independent    Toileting: Supervision                ADL Intervention and task modifications:     Reviewed use of AE and further discussed home set-up and need for further DME, none noted at this time      Functional Measure      Barthel Index:    Bathin  Bladder: 10  Bowels: 10  Groomin  Dressing: 10  Feeding: 10  Mobility: 10  Stairs: 5  Toilet Use: 10  Transfer (Bed to Chair and Back): 15  Total: 85       Barthel and G-code impairment scale:  Percentage of impairment CH  0% CI  1-19% CJ  20-39% CK  40-59% CL  60-79% CM  80-99% CN  100%   Barthel Score 0-100 100 99-80 79-60 59-40 20-39 1-19   0   Barthel Score 0-20 20 17-19 13-16 9-12 5-8 1-4 0      The Barthel ADL Index: Guidelines  1. The index should be used as a record of what a patient does, not as a record of what a patient could do. 2. The main aim is to establish degree of independence from any help, physical or verbal, however minor and for whatever reason. 3. The need for supervision renders the patient not independent. 4. A patient's performance should be established using the best available evidence. Asking the patient, friends/relatives and nurses are the usual sources, but direct observation and common sense are also important. However direct testing is not needed. 5. Usually the patient's performance over the preceding 24-48 hours is important, but occasionally longer periods will be relevant. 6. Middle categories imply that the patient supplies over 50 per cent of the effort. 7. Use of aids to be independent is allowed. Philipp No., Barthel, D.W. (1656). Functional evaluation: the Barthel Index. [address] [facility] (14)2.   ORION Minor, Kendell, ROSIE Rosales (1999). Measuring the change indisability after inpatient rehabilitation; comparison of the responsiveness of the Barthel Index and Functional [place] Measure. Journal of Neurology, Neurosurgery, and Psychiatry, 66(4), 242-512. JUAN J Camilo, ISAIAH Dee, & Renée Klein M.A. (2004.) Assessment of post-stroke quality of life in cost-effectiveness studies: The usefulness of the Barthel Index and the EuroQoL-5D. Quality of Life Research, 13, 976-45             G codes: In compliance with CMSs Claims Based Outcome Reporting, the following G-code set was chosen for this patient based on their primary functional limitation being treated: The outcome measure chosen to determine the severity of the functional limitation was the Barthel  with a score of 85/100 which was correlated with the impairment scale. ? Self Care:     - CURRENT STATUS: CI - 1%-19% impaired, limited or restricted    - GOAL STATUS: CI - 1%-19% impaired, limited or restricted    - D/C STATUS:  CI - 1%-19% impaired, limited or restricted     Occupational Therapy Evaluation Charge Determination   History Examination Decision-Making   LOW Complexity : Brief history review  LOW Complexity : 1-3 performance deficits relating to physical, cognitive , or psychosocial skils that result in activity limitations and / or participation restrictions  LOW Complexity : No comorbidities that affect functional and no verbal or physical assistance needed to complete eval tasks       Based on the above components, the patient evaluation is determined to be of the following complexity level: LOW         Activity Tolerance:   Good  Please refer to the flowsheet for vital signs taken during this treatment.   After treatment:   []  Patient left in no apparent distress sitting up in chair  [x]  Patient left in no apparent distress in bed  [x]  Call bell left within reach  []  Nursing notified  [x] Caregiver present  [x]  Bed alarm activated    COMMUNICATION/EDUCATION:   Communication/Collaboration:  [x]      Home safety education was provided and the patient/caregiver indicated understanding. [x]      Patient/family have participated as able and agree with findings and recommendations. []      Patient is unable to participate in plan of care at this time.   Findings and recommendations were discussed with: Physical Therapist, Registered Nurse and     TOMASA Wang/L  Time Calculation: 24 mins

## 2018-04-26 NOTE — PROGRESS NOTES
2355 Scheduled tylenol given. 0100 temp 99.0 lc    0230 temp 100.1 lc pt given I/S. Daughter stated her father knows how to do very well. Pt able to reach 1750. Enouraged pt to use as much as possible. Reinforced this with daughter.  Will continue to monitor temp and assist with I/S

## 2018-04-26 NOTE — PROGRESS NOTES
4/26/2018  10:17 AM  CM met with pt for assessment. Demographics and PCP were confirmed. Pt is a 80year old,  male who lives in a private residence with his wife, son, and DIL (Pt lives in a mother-in-law suite) (0,0 steps). Pt receives substantial support from family members. PTA, pt was able to complete ADLs with the use of a RW and cane. Pt has a BSC as well to assist during recovery. Pt has prescription drug coverage, and prefers [facility] in [place], [place]. Pt will be receiving outpatient PT, and has an appt scheduled on 4/30/18 (see AVS). Pt's son will provide transport upon discharge. No discharge service needs indicated. Kristofer Mcgarry MA    Care Management Interventions  PCP Verified by CM: Yes César Escobar)  Palliative Care Criteria Met (RRAT>21 & CHF Dx)?: No  Mode of Transport at Discharge:  Other (see comment) (Son)  Transition of Care Consult (CM Consult): Discharge Planning  MyChart Signup: No  Discharge Durable Medical Equipment: No  Physical Therapy Consult: Yes  Occupational Therapy Consult: Yes  Speech Therapy Consult: No  Current Support Network: Relative's Home, Lives with Spouse, Family Lives Nearby  Confirm Follow Up Transport: Family  Plan discussed with Pt/Family/Caregiver: Yes  Freedom of Choice Offered: Yes  Discharge Location  Discharge Placement: Home with outpatient services

## 2018-04-26 NOTE — PROGRESS NOTES
TOTAL HIP ARTHROPLASTY DAILY NOTE     ASSESSMENT / PLAN :   1. Pain Control : Acceptable - Some pain at times, but the patient does not wish to increase their medications  2. Overnight Issues : back pain (hx fusion)  3. Wound or incisional issue : Healing incision with no visible drainage  4. Therapy / Weight Bearing Recommendations : Weight bear as tolerated with use of a walker and two person assist while mobilizing  5. DVT Prophylaxis : Mechanical and Aspirin and mechanical lower extremity compression device  6. Disposition : Discharge to home with home health (maximum of 4 visits)  7. Medical Concerns : none - stable  8. Comments : discharge home when cleared by PT       POD  1 Day Post-Op s/p Procedure(s):  RIGHT TOTAL HIP MAKOPLASTY DIRECT ANTERIOR      SUBJECTIVE :     Concerns : \"My back is bothering me from laying on it in this bed\". OBJECTIVE :     Vitals:    04/25/18 1934 04/25/18 2320 04/26/18 0340 04/26/18 0732   BP: 152/80 127/60 121/59 (!) 148/91   Pulse: 75 70 89 94   Resp: 16 18 16 18   Temp: 97.5 °F (36.4 °C) 98.2 °F (36.8 °C) 97.7 °F (36.5 °C) 99.2 °F (37.3 °C)   SpO2: 99% 98% 99% 99%   Weight:       Height:           Alert and oriented x3. right exam of the hip reveals that the dressing is clean, dry and intact. The patient is able to fire the quadriceps / flex at the hip  Sensation is intact to light touch. No calf pain.      Labs:  Recent Labs      04/26/18   0335   HGB  10.4*   NA  139   K  4.1   CL  103   CO2  29   BUN  11   CREA  0.77   GLU  135*        Patient mobility           Zelda Ingram MD  Cell [phone]  Almedia Goldberg, PA-C  Cell [phone]  Medical Staff : Maxine Palomino  Office : [phone]

## 2018-04-27 NOTE — ANESTHESIA POSTPROCEDURE EVALUATION
Post-Anesthesia Evaluation and Assessment    Patient: Alondra Osorio MRN: [id number]  SSN: [id number]    YOB: 1935  Age: 80 y.o. Sex: male       Cardiovascular Function/Vital Signs  Visit Vitals    BP 93/60    Pulse 90    Temp 36.7 °C (98 °F)    Resp 18    Ht 6' (1.829 m)    Wt 86.3 kg (190 lb 4.1 oz)    SpO2 95%    BMI 25.8 kg/m2       Patient is status post spinal anesthesia for Procedure(s):  RIGHT TOTAL HIP MAKOPLASTY DIRECT ANTERIOR . Nausea/Vomiting: None    Postoperative hydration reviewed and adequate. Pain:  Pain Scale 1: Numeric (0 - 10) (04/25/18 1545)  Pain Intensity 1: 0 (04/25/18 1545)   Managed    Neurological Status:   Neuro (WDL): Exceptions to WDL (04/25/18 1545)  Neuro  Neurologic State: Alert (04/26/18 1300)  Orientation Level: Oriented X4 (04/26/18 1300)  LLE Motor Response: Purposeful;Weak (04/26/18 0810)  RLE Motor Response: Purposeful;Weak (04/26/18 0810)   At baseline    Mental Status and Level of Consciousness: Arousable    Pulmonary Status:   O2 Device: Nasal cannula (04/26/18 0340)   Adequate oxygenation and airway patent    Complications related to anesthesia: None    Post-anesthesia assessment completed.  No concerns    Signed By: Clifton Connelly MD     April 27, 2018

## 2018-05-07 NOTE — DISCHARGE SUMMARY
@2OKIJ@ [address] [place], [place] [id number] [facility] 178 SUMMARY     Patient: Antoni Segura                             Medical Record Number: [id number]                : 1935  Age: 80 y.o. Admit Date: 2018  Discharge Date: 2018    Admission Diagnosis: DJD RIGHT HIP   Primary osteoarthritis of right hip  Discharge Diagnosis: DJD RIGHT HIP     Procedures: Procedure(s):  RIGHT TOTAL  [address] DIRECT ANTERIOR     Surgeon: Genevieve Coyne MD  Assistants: Anahy Hollis PA-C    Anesthesia: spinal  Complications: None     History of Present Illness:  Antoni Segura is a 80 y.o. male with a history of Right hip pain, swelling, and marked loss of function. Despite conservative management and after clinical and radiographic evaluation, it was determined that he suffered from end-stage osteoarthritis and would benefit from Procedure(s):  RIGHT TOTAL HIP [place] , which he consented to undergo after a discussion of the risks, benefits, alternatives, rehab concerns, and potential complications of surgery. Hospital Course:  Antoni Segura tolerated the procedure well. He was transferred  to the recovery room in stable condition. After a brief stay the patient was then transferred to the Joint Replacement Unit at [address] [place] [address]. On postoperative day #1, the dressing was clean and dry, he was neurovascularly intact. The patient was afebrile and vital signs were stable. Calves were soft and non-tender bilaterally. On postoperative day  # 1, the patient was tolerating a regular diet and making satisfactory progress with physical therapy. Hemoglobin and INR prior to discharge were   Lab Results   Component Value Date/Time    HGB 10.4 (L) 2018 03:35 AM       Antoni Segura was cleared by physical therapy and was discharged to Home in stable condition on postoperative day 1.   He was provided with routine postoperative instructions and advised to follow up in my office in 2 weeks following discharge from the hospital.  He was prescribed aspirin for DVT prophylaxis, tramadol and oxycodone for post-operative pain, dulcolax suppository for constipation, and zofran for nausea. Discharge Medications:  Cannot display discharge medications since this patient is not currently admitted.       Signed by: Sissy Barajas MD  5/6/2018

## 2022-06-02 NOTE — PROGRESS NOTES
----- Message from Naz Aguila sent at 6/2/2022  2:03 PM CDT -----  Contact: pt  The pt wants to schedule a mammo appt, no orders in the system, no additional info given and can be reached at [phone]///[id number]     Problem: Mobility Impaired (Adult and Pediatric)  Goal: *Acute Goals and Plan of Care (Insert Text)  Physical Therapy Goals  Initiated 4/26/2018    1. Patient will move from supine to sit and sit to supine , scoot up and down and roll side to side in bed with independence within 4 days. 2. Patient will perform sit to stand with modified independence within 4 days. 3. Patient will ambulate with modified independence for 200 feet with the least restrictive device within 4 days. 4. Patient will ascend/descend 1 stairs with  handrail(s) with modified independence within 4 days. 5. Patient will verbalize and demonstrate understanding of anterior precautions per protocol within 4 days. 6. Patient will perform all home exercise program per protocol with independence within 4 days. physical Therapy TREATMENT  Patient: Jyotsna Oconnor (44 y.o. male)  Date: 4/26/2018  Diagnosis: DJD RIGHT HIP   Primary osteoarthritis of right hip <principal problem not specified>  Procedure(s) (LRB):  RIGHT TOTAL HIP MAKOPLASTY DIRECT ANTERIOR  (Right) 1 Day Post-Op  Precautions: WBAT  Chart, physical therapy assessment, plan of care and goals were reviewed. ASSESSMENT:  Based on the objective data described below, patient participated well with treatment today. Ambulate with rolling walker SBA out on the 4th floor hallway, decreased pace, no loss of balance. Up and down step supervision. OOB to chair as tolerated. Performed some active range of motion exercise on both LE all planes per anterior hip exercise protocol. Family present during the entire therapy session and agreed with all goals set for the patient. patient is cleared to go home per Physical Therapy perspective once medically stable. Notified nurse who agreed to monitor patient.   Progression toward goals:  [x]      Improving appropriately and progressing toward goals  []      Improving slowly and progressing toward goals  []      Not making progress toward goals and plan of care will be adjusted     PLAN:  Patient continues to benefit from skilled intervention to address the above impairments. Continue treatment per established plan of care. Discharge Recommendations:  Please refer to MD recommendation for disposition  Further Equipment Recommendations for Discharge:  Already has DME's     SUBJECTIVE:   I'm ready to do the step. OBJECTIVE DATA SUMMARY:   Functional Mobility Training:  Bed Mobility:  Rolling: Supervision  Supine to Sit: Stand-by assistance  Sit to Supine: Stand-by assistance  Scooting: Stand-by assistance        Transfers:  Sit to Stand: Supervision  Stand to Sit: Supervision  Stand Pivot Transfers: Supervision     Bed to Chair: Supervision                    Ambulation/Gait Training:  Distance (ft): 200 Feet (ft)  Assistive Device: Walker, rolling;Gait belt  Ambulation - Level of Assistance: Stand-by assistance     Gait Description (WDL): Exceptions to WDL  Gait Abnormalities: Antalgic  Right Side Weight Bearing: As tolerated     Base of Support: Widened     Speed/Enriqueta: Pace decreased (<100 feet/min)                       Stairs:  Number of Stairs Trained: 1  Stairs - Level of Assistance: Supervision       Therapeutic Exercises:   Exercises performed per protocol. See morning treatment note for description. Pain:                    Activity Tolerance:    Instructed patient to continue anterior hip exercise protocol on both legs while up on chair or on bed. Please refer to the flowsheet for vital signs taken during this treatment. After treatment:   [x] Patient left in no apparent distress sitting up in chair  [] Patient left in no apparent distress in bed  [x] Call bell left within reach  [x] Nursing notified  [x] Caregiver present  [x] Chair alarm activated    COMMUNICATION/COLLABORATION:   The patients plan of care was discussed with: Registered Nurse and patient    Marium Heath, PT,WCC.    Time Calculation: 25 mins

## 2022-11-06 NOTE — PROGRESS NOTES
- Follow up with orthopedics  - Return with any new or worrisome symptoms Patient cleared by PT and OT, voiding and eating well with small BM this AM. Blood pressure improved with activity this afternoon, and is eager to go home. Seen again by NP, Discharge orders confirmed for this afternoon. IV access removed. Pain medication given prior to wheelchair escort. Discharge instructions reviewed with patient, including next due times of all medication by another RN. Patient escorted out via wheelchair by [facility] volunteer staff, multiple family members to accompany/drive patient home.

## (undated) DEVICE — STERILE POLYISOPRENE POWDER-FREE SURGICAL GLOVES: Brand: PROTEXIS

## (undated) DEVICE — T4 HOOD

## (undated) DEVICE — Device

## (undated) DEVICE — SOLUTION IRRIG 3000ML 0.9% SOD CHL FLX CONT 0797208] ICU MEDICAL INC]

## (undated) DEVICE — SYRINGE MED 20ML STD CLR PLAS LUERLOCK TIP N CTRL DISP

## (undated) DEVICE — 6619 2 PTNT ISO SYS INCISE AREA&LT;(&GT;&&LT;)&GT;P: Brand: STERI-DRAPE™ IOBAN™ 2

## (undated) DEVICE — APPLICATOR BNDG 1MM ADH PREMIERPRO EXOFIN

## (undated) DEVICE — KIT DRP FOR RIO ROBOTIC ARM ASST SYS

## (undated) DEVICE — SKIN MARKER,REGULAR TIP WITH RULER AND LABELS: Brand: DEVON

## (undated) DEVICE — 3M™ TEGADERM™ TRANSPARENT FILM DRESSING FRAME STYLE, 1624W, 2-3/8 IN X 2-3/4 IN (6 CM X 7 CM), 100/CT 4CT/CASE: Brand: 3M™ TEGADERM™

## (undated) DEVICE — (D)PREP SKN CHLRAPRP APPL 26ML -- CONVERT TO ITEM 371833

## (undated) DEVICE — NEEDLE HYPO 21GA L1.5IN INTRAMUSCULAR S STL LATCH BVL UP

## (undated) DEVICE — INFECTION CONTROL KIT SYS

## (undated) DEVICE — 4-PORT MANIFOLD: Brand: NEPTUNE 2

## (undated) DEVICE — SUTURE MCRYL SZ 3-0 L27IN ABSRB UD L24MM PS-1 3/8 CIR PRIM Y936H

## (undated) DEVICE — KIT TRK HIP PROC VIZADISC

## (undated) DEVICE — DRSG AQUACEL SURG 3.5 X 10IN -- CONVERT TO ITEM 370183

## (undated) DEVICE — STERILE POLYISOPRENE POWDER-FREE SURGICAL GLOVES WITH EMOLLIENT COATING: Brand: PROTEXIS

## (undated) DEVICE — SUTURE VCRL + SZ 1-0 L36IN ABSRB UD CTX 1/2 CIR TAPR PNT VCP977H

## (undated) DEVICE — PIN FIX 4X170MM STRL -- 2/PK MAKO

## (undated) DEVICE — SYR LR LCK 1ML GRAD NSAF 30ML --

## (undated) DEVICE — CONTAINER,SPECIMEN,3OZ,OR STRL: Brand: MEDLINE

## (undated) DEVICE — NDL PRT INJ NSAF BLNT 18GX1.5 --

## (undated) DEVICE — LIGHT HANDLE: Brand: DEVON

## (undated) DEVICE — TIBURON SPLIT SHEET: Brand: CONVERTORS

## (undated) DEVICE — 3M™ STERI-DRAPE™ U-DRAPE 1015: Brand: STERI-DRAPE™

## (undated) DEVICE — 3M™ IOBAN™ 2 ANTIMICROBIAL INCISE DRAPE 6650EZ: Brand: IOBAN™ 2

## (undated) DEVICE — REM POLYHESIVE ADULT PATIENT RETURN ELECTRODE: Brand: VALLEYLAB

## (undated) DEVICE — NEEDLE HYPO 18GA L1.5IN PNK S STL HUB POLYPR SHLD REG BVL

## (undated) DEVICE — GAUZE SPONGES,12 PLY: Brand: CURITY

## (undated) DEVICE — HANDPIECE SET WITH COAXIAL HIGH FLOW TIP AND SUCTION TUBE: Brand: INTERPULSE

## (undated) DEVICE — PREP KIT PEEL PTCH POVIDONE IOD

## (undated) DEVICE — TELFA NON-ADHERENT PADS PREPAK: Brand: TELFA

## (undated) DEVICE — SUTURE STRATAFIX SYMMETRIC SZ 1 L18IN ABSRB VLT CT1 L36CM SXPP1A404

## (undated) DEVICE — DEVON™ KNEE AND BODY STRAP 60" X 3" (1.5 M X 7.6 CM): Brand: DEVON

## (undated) DEVICE — SUTURE VCRL SZ 2-0 L36IN ABSRB UD L36MM CT-1 1/2 CIR J945H

## (undated) DEVICE — DUAL IRRIGATION ADAPTOR

## (undated) DEVICE — GOWN,SIRUS,NONRNF,SETINSLV,2XL,18/CS: Brand: MEDLINE

## (undated) DEVICE — STRYKER PERFORMANCE SERIES SAGITTAL BLADE: Brand: STRYKER PERFORMANCE SERIES

## (undated) DEVICE — 3M™ TEGADERM™ TRANSPARENT FILM DRESSING FRAME STYLE, 1626W, 4 IN X 4-3/4 IN (10 CM X 12 CM), 50/CT 4CT/CASE: Brand: 3M™ TEGADERM™

## (undated) DEVICE — Z DISCONTINUED USE (MFG CAT 7984-37) SOLUTION IV SODIUM CHL 0.9% 100 ML INJ

## (undated) DEVICE — MARKER RAD KNEE TIB CKPT STEREOTAXIC IMAG LESION LOC